# Patient Record
Sex: MALE | Race: WHITE | NOT HISPANIC OR LATINO | Employment: FULL TIME | URBAN - METROPOLITAN AREA
[De-identification: names, ages, dates, MRNs, and addresses within clinical notes are randomized per-mention and may not be internally consistent; named-entity substitution may affect disease eponyms.]

---

## 2018-03-09 ENCOUNTER — OFFICE VISIT (OUTPATIENT)
Dept: FAMILY MEDICINE | Facility: CLINIC | Age: 43
End: 2018-03-09
Payer: COMMERCIAL

## 2018-03-09 VITALS
BODY MASS INDEX: 29.86 KG/M2 | HEIGHT: 68 IN | TEMPERATURE: 96.7 F | DIASTOLIC BLOOD PRESSURE: 70 MMHG | WEIGHT: 197 LBS | HEART RATE: 72 BPM | SYSTOLIC BLOOD PRESSURE: 94 MMHG

## 2018-03-09 DIAGNOSIS — Z00.00 ANNUAL PHYSICAL EXAM: ICD-10-CM

## 2018-03-09 DIAGNOSIS — Z13.6 CARDIOVASCULAR SCREENING; LDL GOAL LESS THAN 160: Primary | ICD-10-CM

## 2018-03-09 DIAGNOSIS — Z71.6 ENCOUNTER FOR SMOKING CESSATION COUNSELING: ICD-10-CM

## 2018-03-09 PROCEDURE — 99396 PREV VISIT EST AGE 40-64: CPT | Performed by: FAMILY MEDICINE

## 2018-03-09 NOTE — NURSING NOTE
"Chief Complaint   Patient presents with     Physical       Initial BP 94/70  Pulse 72  Temp 96.7  F (35.9  C) (Tympanic)  Ht 5' 7.72\" (1.72 m)  Wt 197 lb (89.4 kg)  BMI 30.2 kg/m2 Estimated body mass index is 30.2 kg/(m^2) as calculated from the following:    Height as of this encounter: 5' 7.72\" (1.72 m).    Weight as of this encounter: 197 lb (89.4 kg).  Medication Reconciliation: complete    Current Outpatient Prescriptions   Medication Sig Dispense Refill     NO ACTIVE MEDICATIONS        HYDROcodone-acetaminophen 5-325 MG per tablet Take 1-2 tablets by mouth every 4 hours as needed for pain. (Patient not taking: Reported on 3/9/2018) 30 tablet 0     senna-docusate (SENOKOT-S;PERICOLACE) 8.6-50 MG per tablet Take 1 tablet by mouth 2 times daily. (Patient not taking: Reported on 3/9/2018) 60 tablet 1       Jay MARCUS CMA  "

## 2018-03-09 NOTE — PROGRESS NOTES
SUBJECTIVE:   CC: Andre Rose is an 42 year old male who presents for preventative health visit.     Physical   Annual:     Getting at least 3 servings of Calcium per day::  Yes    Bi-annual eye exam::  Yes    Dental care twice a year::  Yes    Sleep apnea or symptoms of sleep apnea::  None    Diet::  Regular (no restrictions)    Frequency of exercise::  2-3 days/week    Duration of exercise::  45-60 minutes    Taking medications regularly::  Yes    Medication side effects::  Not applicable    Additional concerns today::  No            Patient has history of smoking.  Patient currently smoking half to 1 pack a day.  He is motivated to quit.  He is wondering if he can start Chantix.  The past medical history significant for anal fistulas and internal hemorrhoids.  He denies any symptoms at this time.        Today's PHQ-2 Score:   PHQ-2 ( 1999 Pfizer) 3/7/2018   Q1: Little interest or pleasure in doing things 0   Q2: Feeling down, depressed or hopeless 0   PHQ-2 Score 0   Q1: Little interest or pleasure in doing things Not at all   Q2: Feeling down, depressed or hopeless Not at all   PHQ-2 Score 0       Abuse: Current or Past(Physical, Sexual or Emotional)- No  Do you feel safe in your environment - Yes    Social History   Substance Use Topics     Smoking status: Current Every Day Smoker     Packs/day: 1.00     Years: 20.00     Types: Cigarettes     Smokeless tobacco: Never Used     Alcohol use Yes      Comment: 5 drinks 1-2 times per month     No flowsheet data found.    Last PSA: No results found for: PSA    Reviewed orders with patient. Reviewed health maintenance and updated orders accordingly - Yes      Reviewed and updated as needed this visit by clinical staff  Tobacco  Allergies  Meds  Fam Hx  Soc Hx        Reviewed and updated as needed this visit by Provider            Review of Systems  C: NEGATIVE for fever, chills, change in weight  I: NEGATIVE for worrisome rashes, moles or lesions  E: NEGATIVE  "for vision changes or irritation  ENT: NEGATIVE for ear, mouth and throat problems  R: NEGATIVE for significant cough or SOB  CV: NEGATIVE for chest pain, palpitations or peripheral edema  GI: NEGATIVE for nausea, abdominal pain, heartburn, or change in bowel habits   male: negative for dysuria, hematuria, decreased urinary stream, erectile dysfunction, urethral discharge  M: NEGATIVE for significant arthralgias or myalgia  N: NEGATIVE for weakness, dizziness or paresthesias  P: NEGATIVE for changes in mood or affect    OBJECTIVE:   BP 94/70  Pulse 72  Temp 96.7  F (35.9  C) (Tympanic)  Ht 5' 7.72\" (1.72 m)  Wt 197 lb (89.4 kg)  BMI 30.2 kg/m2    Physical Exam  GENERAL: healthy, alert and no distress  EYES: Eyes grossly normal to inspection, PERRL and conjunctivae and sclerae normal  HENT: ear canals and TM's normal, nose and mouth without ulcers or lesions  NECK: no adenopathy, no asymmetry, masses, or scars and thyroid normal to palpation  RESP: lungs clear to auscultation - no rales, rhonchi or wheezes  CV: regular rate and rhythm, normal S1 S2, no S3 or S4, no murmur, click or rub, no peripheral edema and peripheral pulses strong  ABDOMEN: soft, nontender, no hepatosplenomegaly, no masses and bowel sounds normal  MS: no gross musculoskeletal defects noted, no edema  SKIN: no suspicious lesions or rashes  NEURO: Normal strength and tone, mentation intact and speech normal  PSYCH: mentation appears normal, affect normal/bright    ASSESSMENT/PLAN:   1. Annual physical exam  Labs ordered will follow up on that.  - Lipid Profile (Chol, Trig, HDL, LDL calc); Future  - Comprehensive metabolic panel; Future  - PSA, screen; Future    2. CARDIOVASCULAR SCREENING; LDL GOAL LESS THAN 160    - Lipid Profile (Chol, Trig, HDL, LDL calc); Future  - Comprehensive metabolic panel; Future    3. Encounter for smoking cessation counseling    - varenicline (CHANTIX STARTING MONTH PAK) 0.5 MG X 11 & 1 MG X 42 tablet; Take 0.5 " "mg tab daily for 3 days, then 0.5 mg tab twice daily for 4 days, then 1 mg twice daily.  Dispense: 53 tablet; Refill: 0    COUNSELING:   Reviewed preventive health counseling, as reflected in patient instructions       Regular exercise       Healthy diet/nutrition         reports that he has been smoking Cigarettes.  He has a 20.00 pack-year smoking history. He has never used smokeless tobacco.  Tobacco Cessation Action Plan: Information offered: Patient not interested at this time  Estimated body mass index is 30.2 kg/(m^2) as calculated from the following:    Height as of this encounter: 5' 7.72\" (1.72 m).    Weight as of this encounter: 197 lb (89.4 kg).       Counseling Resources:  ATP IV Guidelines  Pooled Cohorts Equation Calculator  FRAX Risk Assessment  ICSI Preventive Guidelines  Dietary Guidelines for Americans, 2010  USDA's MyPlate  ASA Prophylaxis  Lung CA Screening    Conor Corea MD  [facility] [place]  "

## 2018-03-09 NOTE — MR AVS SNAPSHOT
After Visit Summary   3/9/2018    Andre Rose    MRN: [id number]           Patient Information     Date Of Birth          1975        Visit Information        Provider Department      3/9/2018 2:00 PM [place]or Corea [place] [facility] [place]        Today's Diagnoses     CARDIOVASCULAR SCREENING; LDL GOAL LESS THAN 160    -  1    Annual physical exam        Encounter for smoking cessation counseling           Follow-ups after your visit        Follow-up notes from your care team     Return in about 3 days (around 3/12/2018) for lab only appointment fasting.      Your next 10 appointments already scheduled     Mar 16, 2018  8:45 AM CDT   [facility]   [facility]    [address] [id number] [phone]           OUTSIDE LABS: Please include name of facility and Physician that is requesting outside labs be drawn.  Please indicate if labs are fasting or non-fasting on appt notes.  Be as specific as you can on which labs are being drawn.              Future tests that were ordered for you today     Open Future Orders        Priority Expected Expires Ordered    Lipid Profile (Chol, Trig, HDL, LDL calc) Routine  3/9/2019 3/9/2018    Comprehensive metabolic panel Routine  3/9/2019 3/9/2018    PSA, screen Routine  3/9/2019 3/9/2018            Who to contact     If you have questions or need follow up information about today's clinic visit or your schedule please contact [facility] directly at [phone].  Normal or non-critical lab and imaging results will be communicated to you by MyChart, letter or phone within 4 business days after the clinic has received the results. If you do not hear from us within 7 days, please contact the clinic through MyChart or phone. If you have a critical or abnormal lab result, we will notify you by phone as soon as possible.  Submit refill requests through  "MyChart or call your pharmacy and they will forward the refill request to us. Please allow 3 business days for your refill to be completed.          Additional Information About Your Visit        MyChart Information     [employer] gives you secure access to your electronic health record. If you see a primary care provider, you can also send messages to your care team and make appointments. If you have questions, please call your primary care clinic.  If you do not have a primary care provider, please call [phone] and they will assist you.        Care EveryWhere ID     This is your Care EveryWhere ID. This could be used by other organizations to access your [facility] medical records  [id number]        Your Vitals Were     Pulse Temperature Height BMI (Body Mass Index)          72 96.7  F (35.9  C) (Tympanic) 5' 7.72\" (1.72 m) 30.2 kg/m2         Blood Pressure from Last 3 Encounters:   03/09/18 94/70   02/09/13 112/69   02/05/13 120/80    Weight from Last 3 Encounters:   03/09/18 197 lb (89.4 kg)   02/09/13 177 lb 14.4 oz (80.7 kg)   02/05/13 184 lb (83.5 kg)                 Today's Medication Changes          These changes are accurate as of 3/9/18  2:18 PM.  If you have any questions, ask your nurse or doctor.               Start taking these medicines.        Dose/Directions    varenicline 0.5 MG X 11 & 1 MG X 42 tablet   Commonly known as:  CHANTIX STARTING MONTH SHAUNNA   Used for:  Encounter for smoking cessation counseling   Started by:  Conor Corea MD        Take 0.5 mg tab daily for 3 days, then 0.5 mg tab twice daily for 4 days, then 1 mg twice daily.   Quantity:  53 tablet   Refills:  0            Where to get your medicines      These medications were sent to [id number] - [place] - [address] [facility] [address]  [address]  [address] [facility] [address] [place] [address]     Phone:  [phone]     varenicline 0.5 MG X 11 & 1 MG X 42 tablet                Primary " Care Provider Office Phone # Fax #    Ricardo Richardson [phone]       [place] [address]  [facility] [address]        Equal Access to Services     LINDA RAPHAEL : Hadkati good ku alano Soomaali, waaxda luqadaha, qaybta kaalmada adeegyada, niko yeboahaziza freeman. So [facility] [phone].    ATENCIÓN: Si habla español, tiene a rainey disposición servicios gratuitos de asistencia lingüística. [facility] [phone].    We comply with applicable federal civil rights laws and [place] laws. We do not discriminate on the basis of race, color, national origin, age, disability, sex, sexual orientation, or gender identity.            Thank you!     Thank you for choosing [facility]  for your care. Our goal is always to provide you with excellent care. Hearing back from our patients is one way we can continue to improve our services. Please take a few minutes to complete the written survey that you may receive in the mail after your visit with us. Thank you!             Your Updated Medication List - Protect others around you: Learn how to safely use, store and throw away your medicines at www.disposemymeds.org.          This list is accurate as of 3/9/18  2:18 PM.  Always use your most recent med list.                   Brand Name Dispense Instructions for use Diagnosis    HYDROcodone-acetaminophen 5-325 MG per tablet    NORCO    30 tablet    Take 1-2 tablets by mouth every 4 hours as needed for pain.    Rectal fistula       NO ACTIVE MEDICATIONS           senna-docusate 8.6-50 MG per tablet    SENOKOT-S;PERICOLACE    60 tablet    Take 1 tablet by mouth 2 times daily.    Rectal fistula       varenicline 0.5 MG X 11 & 1 MG X 42 tablet    CHANTIX STARTING MONTH SHAUNNA    53 tablet    Take 0.5 mg tab daily for 3 days, then 0.5 mg tab twice daily for 4 days, then 1 mg twice daily.    Encounter for smoking cessation counseling

## 2018-03-16 DIAGNOSIS — Z00.00 ANNUAL PHYSICAL EXAM: ICD-10-CM

## 2018-03-16 DIAGNOSIS — Z13.6 CARDIOVASCULAR SCREENING; LDL GOAL LESS THAN 160: ICD-10-CM

## 2018-03-16 LAB
ALBUMIN SERPL-MCNC: 4.1 G/DL (ref 3.4–5)
ALP SERPL-CCNC: 63 U/L (ref 40–150)
ALT SERPL W P-5'-P-CCNC: 28 U/L (ref 0–70)
ANION GAP SERPL CALCULATED.3IONS-SCNC: 8 MMOL/L (ref 3–14)
AST SERPL W P-5'-P-CCNC: 19 U/L (ref 0–45)
BILIRUB SERPL-MCNC: 0.4 MG/DL (ref 0.2–1.3)
BUN SERPL-MCNC: 17 MG/DL (ref 7–30)
CALCIUM SERPL-MCNC: 9.3 MG/DL (ref 8.5–10.1)
CHLORIDE SERPL-SCNC: 108 MMOL/L (ref 94–109)
CHOLEST SERPL-MCNC: 231 MG/DL
CO2 SERPL-SCNC: 27 MMOL/L (ref 20–32)
CREAT SERPL-MCNC: 1.12 MG/DL (ref 0.66–1.25)
GFR SERPL CREATININE-BSD FRML MDRD: 72 ML/MIN/1.7M2
GLUCOSE SERPL-MCNC: 97 MG/DL (ref 70–99)
HDLC SERPL-MCNC: 59 MG/DL
LDLC SERPL CALC-MCNC: 153 MG/DL
NONHDLC SERPL-MCNC: 172 MG/DL
POTASSIUM SERPL-SCNC: 4.4 MMOL/L (ref 3.4–5.3)
PROT SERPL-MCNC: 7 G/DL (ref 6.8–8.8)
PSA SERPL-ACNC: 3.97 UG/L (ref 0–4)
SODIUM SERPL-SCNC: 143 MMOL/L (ref 133–144)
TRIGL SERPL-MCNC: 95 MG/DL

## 2018-03-16 PROCEDURE — 36415 COLL VENOUS BLD VENIPUNCTURE: CPT | Performed by: FAMILY MEDICINE

## 2018-03-16 PROCEDURE — 80053 COMPREHEN METABOLIC PANEL: CPT | Performed by: FAMILY MEDICINE

## 2018-03-16 PROCEDURE — 80061 LIPID PANEL: CPT | Performed by: FAMILY MEDICINE

## 2018-03-16 PROCEDURE — G0103 PSA SCREENING: HCPCS | Performed by: FAMILY MEDICINE

## 2018-07-09 DIAGNOSIS — Z71.6 ENCOUNTER FOR SMOKING CESSATION COUNSELING: ICD-10-CM

## 2018-07-09 NOTE — TELEPHONE ENCOUNTER
"Requested Prescriptions   Pending Prescriptions Disp Refills     varenicline (CHANTIX STARTING MONTH SHAUNNA) 0.5 MG X 11 & 1 MG X 42 tablet  Last Written Prescription Date:  3/9/18  Last Fill Quantity: 53,  # refills: 0   Last office visit: 3/9/2018 with prescribing provider:  Anmol   Future Office Visit:     53 tablet 0     Sig: Take 0.5 mg tab daily for 3 days, then 0.5 mg tab twice daily for 4 days, then 1 mg twice daily.    Partial Cholinergic Nicotinic Agonist Agents Passed    7/9/2018  8:27 AM       Passed - Blood pressure under 140/90 in past 12 months    BP Readings from Last 3 Encounters:   03/09/18 94/70   02/09/13 112/69   02/05/13 120/80                Passed - Recent (12 mo) or future (30 days) visit within the authorizing provider's specialty    Patient had office visit in the last 12 months or has a visit in the next 30 days with authorizing provider or within the authorizing provider's specialty.  See \"Patient Info\" tab in inbasket, or \"Choose Columns\" in Meds & Orders section of the refill encounter.           Passed - Patient is 18 years of age or older          "

## 2018-07-09 NOTE — TELEPHONE ENCOUNTER
Name of caller: Andre  Relationship of Patient: Self    Reason for Call: PT called to request a refill of his varenicline (CHANTIX STARTING MONTH PAK) 0.5 MG X 11 & 1 MG X 42 tablet.  varenicline (CHANTIX STARTING MONTH PAK) 0.5 MG X 11 & 1 MG X 42 tablet 53 tablet 0 3/9/2018  --   Sig: Take 0.5 mg tab daily for 3 days, then 0.5 mg tab twice daily for 4 days, then 1 mg twice daily.   Class: E-Prescribe   Order: [id number]   E-Prescribing Status: Receipt confirmed by pharmacy (3/9/2018  2:10 PM CST)       Best phone number to reach pt at is: [phone]  Ok to leave a message with medical info? Yes    Pharmacy preferred (if calling for a refill): Lonnie Patel  Patient Representative - [facility]

## 2018-07-13 RX ORDER — VARENICLINE TARTRATE 1 MG/1
1 TABLET, FILM COATED ORAL 2 TIMES DAILY
Qty: 56 TABLET | Refills: 2 | Status: SHIPPED | OUTPATIENT
Start: 2018-07-13 | End: 2019-10-25

## 2018-07-13 NOTE — TELEPHONE ENCOUNTER
Patient states that he started the Starter pack late and is looking to have the continued month supply for chantix.  He has been using continually and today is his last.     Medication and pharmacy pended  Yue Singh RN - Triage  [facility]

## 2018-07-13 NOTE — TELEPHONE ENCOUNTER
Pt calling to check status of refill.  Encounter is closed but there is no documentation stating why the medication was not refilled.  Pt is going out of town tomorrow and today is the last day of the pill he has.  Can we refill or please note why this cannot be refilled and route appropriately.  Alejandra Marcelino

## 2019-10-25 ENCOUNTER — OFFICE VISIT (OUTPATIENT)
Dept: FAMILY MEDICINE | Facility: CLINIC | Age: 44
End: 2019-10-25
Payer: COMMERCIAL

## 2019-10-25 VITALS
DIASTOLIC BLOOD PRESSURE: 84 MMHG | HEIGHT: 69 IN | HEART RATE: 78 BPM | OXYGEN SATURATION: 99 % | TEMPERATURE: 96.6 F | WEIGHT: 201 LBS | BODY MASS INDEX: 29.77 KG/M2 | SYSTOLIC BLOOD PRESSURE: 118 MMHG

## 2019-10-25 DIAGNOSIS — Z12.5 SCREENING FOR PROSTATE CANCER: ICD-10-CM

## 2019-10-25 DIAGNOSIS — Z13.6 CARDIOVASCULAR SCREENING; LDL GOAL LESS THAN 160: ICD-10-CM

## 2019-10-25 DIAGNOSIS — R97.20 ELEVATED PROSTATE SPECIFIC ANTIGEN (PSA): ICD-10-CM

## 2019-10-25 DIAGNOSIS — Z00.00 ROUTINE GENERAL MEDICAL EXAMINATION AT A HEALTH CARE FACILITY: Primary | ICD-10-CM

## 2019-10-25 DIAGNOSIS — R30.9 PAIN WITH URINATION: ICD-10-CM

## 2019-10-25 DIAGNOSIS — Z71.6 ENCOUNTER FOR SMOKING CESSATION COUNSELING: ICD-10-CM

## 2019-10-25 DIAGNOSIS — R20.0 HAND NUMBNESS: ICD-10-CM

## 2019-10-25 DIAGNOSIS — K60.40 RECTAL FISTULA: ICD-10-CM

## 2019-10-25 LAB
ALBUMIN SERPL-MCNC: 4.5 G/DL (ref 3.4–5)
ALBUMIN UR-MCNC: NEGATIVE MG/DL
ALP SERPL-CCNC: 70 U/L (ref 40–150)
ALT SERPL W P-5'-P-CCNC: 32 U/L (ref 0–70)
ANION GAP SERPL CALCULATED.3IONS-SCNC: 6 MMOL/L (ref 3–14)
APPEARANCE UR: CLEAR
AST SERPL W P-5'-P-CCNC: 12 U/L (ref 0–45)
BILIRUB SERPL-MCNC: 0.6 MG/DL (ref 0.2–1.3)
BILIRUB UR QL STRIP: NEGATIVE
BUN SERPL-MCNC: 16 MG/DL (ref 7–30)
CALCIUM SERPL-MCNC: 9.5 MG/DL (ref 8.5–10.1)
CHLORIDE SERPL-SCNC: 104 MMOL/L (ref 94–109)
CHOLEST SERPL-MCNC: 237 MG/DL
CO2 SERPL-SCNC: 26 MMOL/L (ref 20–32)
COLOR UR AUTO: YELLOW
CREAT SERPL-MCNC: 1.06 MG/DL (ref 0.66–1.25)
GFR SERPL CREATININE-BSD FRML MDRD: 85 ML/MIN/{1.73_M2}
GLUCOSE SERPL-MCNC: 95 MG/DL (ref 70–99)
GLUCOSE UR STRIP-MCNC: NEGATIVE MG/DL
HDLC SERPL-MCNC: 62 MG/DL
HGB UR QL STRIP: NEGATIVE
KETONES UR STRIP-MCNC: NEGATIVE MG/DL
LDLC SERPL CALC-MCNC: 156 MG/DL
LEUKOCYTE ESTERASE UR QL STRIP: NEGATIVE
NITRATE UR QL: NEGATIVE
NONHDLC SERPL-MCNC: 175 MG/DL
PH UR STRIP: 7 PH (ref 5–7)
POTASSIUM SERPL-SCNC: 4.2 MMOL/L (ref 3.4–5.3)
PROT SERPL-MCNC: 7.5 G/DL (ref 6.8–8.8)
PSA SERPL-MCNC: 4.43 UG/L (ref 0–4)
SODIUM SERPL-SCNC: 136 MMOL/L (ref 133–144)
SOURCE: NORMAL
SP GR UR STRIP: 1.01 (ref 1–1.03)
TRIGL SERPL-MCNC: 96 MG/DL
UROBILINOGEN UR STRIP-ACNC: 0.2 EU/DL (ref 0.2–1)

## 2019-10-25 PROCEDURE — 80061 LIPID PANEL: CPT | Performed by: FAMILY MEDICINE

## 2019-10-25 PROCEDURE — 81003 URINALYSIS AUTO W/O SCOPE: CPT | Performed by: FAMILY MEDICINE

## 2019-10-25 PROCEDURE — 99213 OFFICE O/P EST LOW 20 MIN: CPT | Mod: 25 | Performed by: FAMILY MEDICINE

## 2019-10-25 PROCEDURE — 84153 ASSAY OF PSA TOTAL: CPT | Performed by: FAMILY MEDICINE

## 2019-10-25 PROCEDURE — 36415 COLL VENOUS BLD VENIPUNCTURE: CPT | Performed by: FAMILY MEDICINE

## 2019-10-25 PROCEDURE — 99396 PREV VISIT EST AGE 40-64: CPT | Performed by: FAMILY MEDICINE

## 2019-10-25 PROCEDURE — 87389 HIV-1 AG W/HIV-1&-2 AB AG IA: CPT | Performed by: FAMILY MEDICINE

## 2019-10-25 PROCEDURE — 80053 COMPREHEN METABOLIC PANEL: CPT | Performed by: FAMILY MEDICINE

## 2019-10-25 SDOH — HEALTH STABILITY: MENTAL HEALTH: HOW OFTEN DO YOU HAVE A DRINK CONTAINING ALCOHOL?: 2-4 TIMES A MONTH

## 2019-10-25 ASSESSMENT — MIFFLIN-ST. JEOR: SCORE: 1784.23

## 2019-10-25 NOTE — PROGRESS NOTES
SUBJECTIVE:   CC: Andre Rose is an 44 year old male who presents for preventive health visit.     Healthy Habits:    Do you get at least three servings of calcium containing foods daily (dairy, green leafy vegetables, etc.)? No    Amount of exercise or daily activities, outside of work: 0-1 day(s) per week    Problems taking medications regularly not applicable    Medication side effects: No    Have you had an eye exam in the past two years? yes    Do you see a dentist twice per year? yes    Do you have sleep apnea, excessive snoring or daytime drowsiness?no    Recently started smoking again in June and would like to restart Chantix  Left sided forearm and ahnd numbness, on and off, x couple months, no chest pain  Pain with urination, worse after alcohol, playing hockey and ejaculation     Today's PHQ-2 Score:   PHQ-2 ( 1999 Pfizer) 10/25/2019 3/7/2018   Q1: Little interest or pleasure in doing things 0 0   Q2: Feeling down, depressed or hopeless 0 0   PHQ-2 Score 0 0   Q1: Little interest or pleasure in doing things - Not at all   Q2: Feeling down, depressed or hopeless - Not at all   PHQ-2 Score - 0       Abuse: Current or Past(Physical, Sexual or Emotional)- No  Do you feel safe in your environment? Yes    Social History     Tobacco Use     Smoking status: Current Every Day Smoker     Packs/day: 0.50     Years: 20.00     Pack years: 10.00     Types: Cigarettes     Smokeless tobacco: Never Used   Substance Use Topics     Alcohol use: Yes     Frequency: 2-4 times a month     If you drink alcohol do you typically have >3 drinks per day or >7 drinks per week? No                      Last PSA:   PSA   Date Value Ref Range Status   03/16/2018 3.97 0 - 4 ug/L Final     Comment:     Assay Method:  Chemiluminescence using Siemens Vista analyzer       Reviewed orders with patient. Reviewed health maintenance and updated orders accordingly - Yes  Lab work is in process  Labs reviewed in EPIC    Reviewed and updated  "as needed this visit by clinical staff  Allergies  Meds  Fam Hx         Reviewed and updated as needed this visit by Provider            ROS:  CONSTITUTIONAL: NEGATIVE for fever, chills, change in weight  INTEGUMENTARY/SKIN: NEGATIVE for worrisome rashes, moles or lesions  EYES: NEGATIVE for vision changes or irritation  ENT: NEGATIVE for ear, mouth and throat problems  RESP: NEGATIVE for significant cough or SOB  CV: NEGATIVE for chest pain, palpitations or peripheral edema  GI: NEGATIVE for nausea, abdominal pain, heartburn, or change in bowel habits   male: negative for dysuria, hematuria, decreased urinary stream, erectile dysfunction, urethral discharge  MUSCULOSKELETAL: NEGATIVE for significant arthralgias or myalgia  NEURO: NEGATIVE for weakness, dizziness or paresthesias  PSYCHIATRIC: NEGATIVE for changes in mood or affect    OBJECTIVE:   /84   Pulse 78   Temp 96.6  F (35.9  C) (Tympanic)   Ht 1.74 m (5' 8.5\")   Wt 91.2 kg (201 lb)   SpO2 99%   BMI 30.11 kg/m    EXAM:  GENERAL: healthy, alert and no distress  EYES: Eyes grossly normal to inspection, PERRL and conjunctivae and sclerae normal  HENT: ear canals and TM's normal, nose and mouth without ulcers or lesions  NECK: no adenopathy, no asymmetry, masses, or scars and thyroid normal to palpation  RESP: lungs clear to auscultation - no rales, rhonchi or wheezes  CV: regular rate and rhythm, normal S1 S2, no S3 or S4, no murmur, click or rub, no peripheral edema and peripheral pulses strong  ABDOMEN: soft, nontender, no hepatosplenomegaly, no masses and bowel sounds normal  MS: no gross musculoskeletal defects noted, no edema  SKIN: no suspicious lesions or rashes  NEURO: Normal strength and tone, mentation intact and speech normal  PSYCH: mentation appears normal, affect normal/bright  No neck discomfort.  Range of motion in the arm is normal.  Strength is normal.      ASSESSMENT/PLAN:   1. Routine general medical examination at [facility]" "care facility    - HIV Screening  - varenicline (CHANTIX STARTING MONTH PAK) 0.5 MG X 11 & 1 MG X 42 tablet; Take 0.5 mg tab daily for 3 days, then 0.5 mg tab twice daily for 4 days, then 1 mg twice daily.  Dispense: 53 tablet; Refill: 0  - Lipid panel reflex to direct LDL Fasting  - Comprehensive metabolic panel    2. CARDIOVASCULAR SCREENING; LDL GOAL LESS THAN 160    - Lipid panel reflex to direct LDL Fasting  - Comprehensive metabolic panel    3. Encounter for smoking cessation counseling  Patient quit smoking but he restarted he would like to retry Chantix.  Discussed with him he needs to have a proper motivation and willingness to stop smoking in that case Chantix is very helpful.  - varenicline (CHANTIX STARTING MONTH PAK) 0.5 MG X 11 & 1 MG X 42 tablet; Take 0.5 mg tab daily for 3 days, then 0.5 mg tab twice daily for 4 days, then 1 mg twice daily.  Dispense: 53 tablet; Refill: 0    4. Rectal fistula  Tissues    5. Pain with urination  Planes of pain with urination after playing or drinking alcohol.  He is advised to keep himself hydrated.  Will check urine to make sure everything is normal.  If this continue further evaluation from urology.  - UA reflex to Microscopic    6. Screening for prostate cancer    - PSA, tumor marker    7. Hand numbness  On and off symptoms of numbness in the left forearm area is could be mild nerve compression.  Suggested physical therapy if symptoms persist we may need to follow further evaluation possible MRI of the neck.  Warning signs were discussed he will let us know if his symptoms does not improve with home therapy and exercises.      COUNSELING:  Reviewed preventive health counseling, as reflected in patient instructions       Regular exercise       Healthy diet/nutrition    Estimated body mass index is 30.11 kg/m  as calculated from the following:    Height as of this encounter: 1.74 m (5' 8.5\").    Weight as of this encounter: 91.2 kg (201 lb).         reports that he " has been smoking cigarettes. He has a 10.00 pack-year smoking history. He has never used smokeless tobacco.      Counseling Resources:  ATP IV Guidelines  Pooled Cohorts Equation Calculator  FRAX Risk Assessment  ICSI Preventive Guidelines  Dietary Guidelines for Americans, 2010  USDA's MyPlate  ASA Prophylaxis  Lung CA Screening    Conor Corea MD  [facility] [place]

## 2019-10-26 LAB — HIV 1+2 AB+HIV1 P24 AG SERPL QL IA: NONREACTIVE

## 2019-11-02 ENCOUNTER — HEALTH MAINTENANCE LETTER (OUTPATIENT)
Age: 44
End: 2019-11-02

## 2019-11-16 ASSESSMENT — ENCOUNTER SYMPTOMS
ABDOMINAL PAIN: 0
BLOATING: 0
DIARRHEA: 1
RECTAL PAIN: 1
VOMITING: 0
FLANK PAIN: 0
BOWEL INCONTINENCE: 0
HEMATURIA: 0
NAUSEA: 0
DIFFICULTY URINATING: 1
BLOOD IN STOOL: 1
DYSURIA: 1
JAUNDICE: 0
CONSTIPATION: 0
HEARTBURN: 1

## 2019-11-18 ENCOUNTER — OFFICE VISIT (OUTPATIENT)
Dept: UROLOGY | Facility: CLINIC | Age: 44
End: 2019-11-18
Payer: COMMERCIAL

## 2019-11-18 VITALS
BODY MASS INDEX: 29.77 KG/M2 | DIASTOLIC BLOOD PRESSURE: 68 MMHG | WEIGHT: 201 LBS | SYSTOLIC BLOOD PRESSURE: 132 MMHG | HEIGHT: 69 IN | HEART RATE: 84 BPM

## 2019-11-18 DIAGNOSIS — R97.20 ELEVATED PROSTATE SPECIFIC ANTIGEN (PSA): Primary | ICD-10-CM

## 2019-11-18 LAB
ALBUMIN UR-MCNC: NEGATIVE MG/DL
APPEARANCE UR: CLEAR
BILIRUB UR QL STRIP: NEGATIVE
COLOR UR AUTO: YELLOW
GLUCOSE UR STRIP-MCNC: NEGATIVE MG/DL
HGB UR QL STRIP: NEGATIVE
KETONES UR STRIP-MCNC: NEGATIVE MG/DL
LEUKOCYTE ESTERASE UR QL STRIP: NEGATIVE
NITRATE UR QL: NEGATIVE
PH UR STRIP: 7 PH (ref 5–7)
PR INTERVAL - MUSE: 31
SOURCE: NORMAL
SP GR UR STRIP: 1.01 (ref 1–1.03)
UROBILINOGEN UR STRIP-ACNC: 0.2 EU/DL (ref 0.2–1)

## 2019-11-18 PROCEDURE — 81003 URINALYSIS AUTO W/O SCOPE: CPT | Mod: QW | Performed by: UROLOGY

## 2019-11-18 PROCEDURE — 99244 OFF/OP CNSLTJ NEW/EST MOD 40: CPT | Mod: 25 | Performed by: UROLOGY

## 2019-11-18 PROCEDURE — 51798 US URINE CAPACITY MEASURE: CPT | Performed by: UROLOGY

## 2019-11-18 ASSESSMENT — MIFFLIN-ST. JEOR: SCORE: 1784.17

## 2019-11-18 ASSESSMENT — PAIN SCALES - GENERAL: PAINLEVEL: NO PAIN (0)

## 2019-11-18 NOTE — LETTER
11/18/2019       RE: Andre Rose  [address] [place] [address]     Dear Colleague,    Thank you for referring your patient, Andre Rose, to the [facility] [place] at [facility]. Please see a copy of my visit note below.    [facility]  Main Office: [address]  [address]  [place] [address]       CHIEF COMPLAINT:  Elevated PSA    HISTORY:   I was asked by Dr Corea to see this 44 year old gentleman who presents with an elevated PSA.  He does have an uncle with history of prostate cancer but has no first-degree relatives with a history of prostate cancer.  He had a PSA checked at a physical exam last year and it was 3.97.  With the PSA was rechecked this year and it is 4.43.  Overall he is asymptomatic.  He says that normally he has no difficulty urinating and no urgency or frequency or nocturia.  No history of gross hematuria or infections.  He says that he sometimes does have dysuria after ejaculation or after drinking alcohol.  4.43      PAST MEDICAL HISTORY:   Past Medical History:   Diagnosis Date     Anal fistula      Blood in semen      Epididymitis, bilateral      Former smoker quit 3/10    16 pack year history     Headaches      Hematospermia      Orchitis, epididymitis, and epididymo-orchitis, with abscess      Overweight (BMI 25.0-29.9)      Penile discharge      Premature ejaculation 10/10/2007     Katy        PAST SURGICAL HISTORY:   Past Surgical History:   Procedure Laterality Date     ADENOIDECTOMY       EXAM UNDER ANESTHESIA RECTUM  8/25/2012    Procedure: EXAM UNDER ANESTHESIA RECTUM;  EXAM UNDER ANESTHESIA, NO EVIDENCE OF FISTULA FOUND;  Surgeon: Goldberg, Stanley Morton, MD;  Location: [facility]     FISTULOTOMY RECTUM  12/10/2011    Procedure:FISTULOTOMY RECTUM; Surgeon:GOLDBERG, STANLEY MORTON; Location:[facility]     FISTULOTOMY RECTUM  2/9/2013    Procedure: FISTULOTOMY RECTUM;  FISTULOTOMY;   "Surgeon: Goldberg, Stanley Morton, MD;  Location: [facility] COLONOSCOPY THRU STOMA, DIAGNOSTIC  2009    repeat age 50     REMV PILONIDAL LESION SIMPLE         FAMILY HISTORY:   Family History   Problem Relation Age of Onset     Hypertension Mother      Lung Cancer Mother      Prostate Cancer Maternal Uncle         ~52     Prostate Cancer Paternal Grandfather      Alzheimer Disease Maternal Grandfather      Cancer - colorectal No family hx of      Gastrointestinal Disease No family hx of      Diabetes No family hx of      C.A.D. No family hx of      Asthma No family hx of      Cerebrovascular Disease No family hx of        SOCIAL HISTORY:   Social History     Tobacco Use     Smoking status: Current Every Day Smoker     Packs/day: 0.50     Years: 20.00     Pack years: 10.00     Types: Cigarettes     Smokeless tobacco: Never Used   Substance Use Topics     Alcohol use: Yes     Frequency: 2-4 times a month          Allergies   Allergen Reactions     Penicillins Swelling         Current Outpatient Medications:      varenicline (CHANTIX STARTING MONTH SHAUNNA) 0.5 MG X 11 & 1 MG X 42 tablet, Take 0.5 mg tab daily for 3 days, then 0.5 mg tab twice daily for 4 days, then 1 mg twice daily., Disp: 53 tablet, Rfl: 0    Review Of Systems:  Skin: No rash, pruritis, or skin pigmentation  Eyes: No changes in vision  Ears/Nose/Throat: No changes in hearing, no nosebleeds  Respiratory: No shortness of breath, dyspnea on exertion, cough, or hemoptysis  Cardiovascular: No chest pain or palpitations  Gastrointestinal: No diarrhea or constipation. No abdominal pain. No hematochezia  Genitourinary: see HPI  Musculoskeletal: No pain or swelling of joints, normal range of motion  Neurologic: No weakness or tremors  Psychiatric: No recent changes in memory or mood  Hematologic/Lymphatic/Immunologic: No easy bruising or enlarged lymph nodes  Endocrine: No weight gain or loss      PHYSICAL EXAM:    /68   Pulse 84   Ht 1.74 m (5' 8.5\")  "  Wt 91.2 kg (201 lb)   BMI 30.12 kg/m     General appearance: In NAD, conversant  HEENT: Normocephalic and atraumatic, anicteric sclera  Cardiovascular: Not examined  Respiratory: normal, non-labored breathing  Gastrointestinal: negative, Abdomen soft, non-tender, and non-distended.   Musculoskeletal: Not Examined  Peripheral Vascular/extremity: No peripheral edema  Skin: Normal temperature, turgor, and texture. No rash  Psychiatric: Appropriate affect, alert and oriented to person, place, and time    Penis: Not Examined  Scrotal Skin: Not Examined   Testicles: Not Examined  Epididymis: Not Examined  Lymphatic: Not examined  Digital Rectal Exam: The prostate is not enlarged, benign and symmetric to palpation.  No evidence of prostatitis    Cystoscopy: Not done      PSA: 4.43    UA RESULTS:  Recent Labs   Lab Test 10/25/19  0938   COLOR Yellow   APPEARANCE Clear   URINEGLC Negative   URINEBILI Negative   URINEKETONE Negative   SG 1.015   UBLD Negative   URINEPH 7.0   PROTEIN Negative   UROBILINOGEN 0.2   NITRITE Negative   LEUKEST Negative       Bladder Scan:     Other Labs:      Imaging Studies: None      CLINICAL IMPRESSION:   Elevated PSA     PLAN:   He has an elevated PSA at a young age.  The PSA is also risen over the past year.  He is in very good health.  I recommended further evaluation.  We discussed his options.  I recommended that he begin evaluation with an MRI of the prostate.  We discussed this test.  If the MRI shows abnormalities he will certainly need to proceed with a prostate biopsy and we discussed prostate biopsy in detail today as well.  Even if the MRI is negative he may need to proceed with biopsy and we discussed this possibility.  MRI was ordered for him and I will contact him with those results.      Hamzah Bowen MD    Answers for HPI/ROS submitted by the patient on 11/16/2019   General Symptoms: No  Skin Symptoms: No  HENT Symptoms: No  EYE SYMPTOMS: No  HEART SYMPTOMS: No  LUNG  SYMPTOMS: No  INTESTINAL SYMPTOMS: Yes  URINARY SYMPTOMS: Yes  REPRODUCTIVE SYMPTOMS: No  SKELETAL SYMPTOMS: No  BLOOD SYMPTOMS: No  NERVOUS SYSTEM SYMPTOMS: No  MENTAL HEALTH SYMPTOMS: No  Heart burn or indigestion: Yes  Nausea: No  Vomiting: No  Abdominal pain: No  Bloating: No  Constipation: No  Diarrhea: Yes  Blood in stool: Yes  Black stools: Yes  Rectal or Anal pain: Yes  Fecal incontinence: No  Yellowing of skin or eyes: No  Vomit with blood: No  Change in stools: No  Trouble holding urine or incontinence: No  Pain or burning: Yes  Trouble starting or stopping: Yes  Increased frequency of urination: No  Blood in urine: No  Decreased frequency of urination: No  Frequent nighttime urination: No  Flank pain: No  Difficulty emptying bladder: Yes      Again, thank you for allowing me to participate in the care of your patient.      Sincerely,    Hamzah Bowen MD

## 2019-11-18 NOTE — PROGRESS NOTES
[facility]  Main Office: [address]  [address]  [place] [address]       CHIEF COMPLAINT:  Elevated PSA    HISTORY:   I was asked by Dr Corea to see this 44 year old gentleman who presents with an elevated PSA.  He does have an uncle with history of prostate cancer but has no first-degree relatives with a history of prostate cancer.  He had a PSA checked at a physical exam last year and it was 3.97.  With the PSA was rechecked this year and it is 4.43.  Overall he is asymptomatic.  He says that normally he has no difficulty urinating and no urgency or frequency or nocturia.  No history of gross hematuria or infections.  He says that he sometimes does have dysuria after ejaculation or after drinking alcohol.  4.43      PAST MEDICAL HISTORY:   Past Medical History:   Diagnosis Date     Anal fistula      Blood in semen      Epididymitis, bilateral      Former smoker quit 3/10    16 pack year history     Headaches      Hematospermia      Orchitis, epididymitis, and epididymo-orchitis, with abscess      Overweight (BMI 25.0-29.9)      Penile discharge      Premature ejaculation 10/10/2007     Snores        PAST SURGICAL HISTORY:   Past Surgical History:   Procedure Laterality Date     ADENOIDECTOMY       EXAM UNDER ANESTHESIA RECTUM  8/25/2012    Procedure: EXAM UNDER ANESTHESIA RECTUM;  EXAM UNDER ANESTHESIA, NO EVIDENCE OF FISTULA FOUND;  Surgeon: Goldberg, Stanley Morton, MD;  Location: [facility]     FISTULOTOMY RECTUM  12/10/2011    Procedure:FISTULOTOMY RECTUM; Surgeon:GOLDBERG, STANLEY MORTON; Location:[facility]     FISTULOTOMY RECTUM  2/9/2013    Procedure: FISTULOTOMY RECTUM;  FISTULOTOMY;  Surgeon: Goldberg, Stanley Morton, MD;  Location: [facility]     HC COLONOSCOPY THRU STOMA, DIAGNOSTIC  2009    repeat age 50     REMV PILONIDAL LESION SIMPLE         FAMILY HISTORY:   Family History   Problem Relation Age of Onset     Hypertension Mother      Lung Cancer Mother      Prostate Cancer Maternal Uncle          "~52     Prostate Cancer Paternal Grandfather      Alzheimer Disease Maternal Grandfather      Cancer - colorectal No family hx of      Gastrointestinal Disease No family hx of      Diabetes No family hx of      C.A.D. No family hx of      Asthma No family hx of      Cerebrovascular Disease No family hx of        SOCIAL HISTORY:   Social History     Tobacco Use     Smoking status: Current Every Day Smoker     Packs/day: 0.50     Years: 20.00     Pack years: 10.00     Types: Cigarettes     Smokeless tobacco: Never Used   Substance Use Topics     Alcohol use: Yes     Frequency: 2-4 times a month          Allergies   Allergen Reactions     Penicillins Swelling         Current Outpatient Medications:      varenicline (CHANTIX STARTING MONTH PAK) 0.5 MG X 11 & 1 MG X 42 tablet, Take 0.5 mg tab daily for 3 days, then 0.5 mg tab twice daily for 4 days, then 1 mg twice daily., Disp: 53 tablet, Rfl: 0    Review Of Systems:  Skin: No rash, pruritis, or skin pigmentation  Eyes: No changes in vision  Ears/Nose/Throat: No changes in hearing, no nosebleeds  Respiratory: No shortness of breath, dyspnea on exertion, cough, or hemoptysis  Cardiovascular: No chest pain or palpitations  Gastrointestinal: No diarrhea or constipation. No abdominal pain. No hematochezia  Genitourinary: see HPI  Musculoskeletal: No pain or swelling of joints, normal range of motion  Neurologic: No weakness or tremors  Psychiatric: No recent changes in memory or mood  Hematologic/Lymphatic/Immunologic: No easy bruising or enlarged lymph nodes  Endocrine: No weight gain or loss      PHYSICAL EXAM:    /68   Pulse 84   Ht 1.74 m (5' 8.5\")   Wt 91.2 kg (201 lb)   BMI 30.12 kg/m    General appearance: In NAD, conversant  HEENT: Normocephalic and atraumatic, anicteric sclera  Cardiovascular: Not examined  Respiratory: normal, non-labored breathing  Gastrointestinal: negative, Abdomen soft, non-tender, and non-distended.   Musculoskeletal: Not " Examined  Peripheral Vascular/extremity: No peripheral edema  Skin: Normal temperature, turgor, and texture. No rash  Psychiatric: Appropriate affect, alert and oriented to person, place, and time    Penis: Not Examined  Scrotal Skin: Not Examined   Testicles: Not Examined  Epididymis: Not Examined  Lymphatic: Not examined  Digital Rectal Exam: The prostate is not enlarged, benign and symmetric to palpation.  No evidence of prostatitis    Cystoscopy: Not done      PSA: 4.43    UA RESULTS:  Recent Labs   Lab Test 10/25/19  0938   COLOR Yellow   APPEARANCE Clear   URINEGLC Negative   URINEBILI Negative   URINEKETONE Negative   SG 1.015   UBLD Negative   URINEPH 7.0   PROTEIN Negative   UROBILINOGEN 0.2   NITRITE Negative   LEUKEST Negative       Bladder Scan:     Other Labs:      Imaging Studies: None      CLINICAL IMPRESSION:   Elevated PSA     PLAN:   He has an elevated PSA at a young age.  The PSA is also risen over the past year.  He is in very good health.  I recommended further evaluation.  We discussed his options.  I recommended that he begin evaluation with an MRI of the prostate.  We discussed this test.  If the MRI shows abnormalities he will certainly need to proceed with a prostate biopsy and we discussed prostate biopsy in detail today as well.  Even if the MRI is negative he may need to proceed with biopsy and we discussed this possibility.  MRI was ordered for him and I will contact him with those results.      Hamzah Bowen MD    Answers for HPI/ROS submitted by the patient on 11/16/2019   General Symptoms: No  Skin Symptoms: No  HENT Symptoms: No  EYE SYMPTOMS: No  HEART SYMPTOMS: No  LUNG SYMPTOMS: No  INTESTINAL SYMPTOMS: Yes  URINARY SYMPTOMS: Yes  REPRODUCTIVE SYMPTOMS: No  SKELETAL SYMPTOMS: No  BLOOD SYMPTOMS: No  NERVOUS SYSTEM SYMPTOMS: No  MENTAL HEALTH SYMPTOMS: No  Heart burn or indigestion: Yes  Nausea: No  Vomiting: No  Abdominal pain: No  Bloating: No  Constipation: No  Diarrhea:  Yes  Blood in stool: Yes  Black stools: Yes  Rectal or Anal pain: Yes  Fecal incontinence: No  Yellowing of skin or eyes: No  Vomit with blood: No  Change in stools: No  Trouble holding urine or incontinence: No  Pain or burning: Yes  Trouble starting or stopping: Yes  Increased frequency of urination: No  Blood in urine: No  Decreased frequency of urination: No  Frequent nighttime urination: No  Flank pain: No  Difficulty emptying bladder: Yes

## 2019-12-01 ENCOUNTER — ANCILLARY PROCEDURE (OUTPATIENT)
Dept: MRI IMAGING | Facility: CLINIC | Age: 44
End: 2019-12-01
Attending: UROLOGY
Payer: COMMERCIAL

## 2019-12-01 DIAGNOSIS — R97.20 ELEVATED PROSTATE SPECIFIC ANTIGEN (PSA): ICD-10-CM

## 2019-12-01 RX ORDER — GADOBUTROL 604.72 MG/ML
10 INJECTION INTRAVENOUS ONCE
Status: COMPLETED | OUTPATIENT
Start: 2019-12-01 | End: 2019-12-01

## 2019-12-01 RX ADMIN — GADOBUTROL 9 ML: 604.72 INJECTION INTRAVENOUS at 16:32

## 2019-12-02 ENCOUNTER — TELEPHONE (OUTPATIENT)
Dept: UROLOGY | Facility: CLINIC | Age: 44
End: 2019-12-02

## 2019-12-02 DIAGNOSIS — R97.20 ELEVATED PROSTATE SPECIFIC ANTIGEN (PSA): Primary | ICD-10-CM

## 2019-12-02 NOTE — TELEPHONE ENCOUNTER
Spoke on phone re:MRI results  Recommended close follow up: see me in 2 months to recheck PSA, will need to proceed with biopsy if PSA does not improve

## 2019-12-13 ENCOUNTER — TELEPHONE (OUTPATIENT)
Dept: UROLOGY | Facility: CLINIC | Age: 44
End: 2019-12-13

## 2019-12-13 NOTE — TELEPHONE ENCOUNTER
----- Message from Catia Anglin sent at 12/2/2019  3:50 PM CST -----    ----- Message -----  From: Hamzah Bowen MD  Sent: 12/2/2019   3:43 PM CST  To: [facility] - Scheduling Pool    See me in 2 months for psa check

## 2020-08-04 DIAGNOSIS — R97.20 ELEVATED PROSTATE SPECIFIC ANTIGEN (PSA): Primary | ICD-10-CM

## 2020-08-04 DIAGNOSIS — R97.20 ELEVATED PROSTATE SPECIFIC ANTIGEN (PSA): ICD-10-CM

## 2020-08-04 LAB — PSA SERPL-MCNC: 4.7 NG/ML (ref 0–4)

## 2020-08-04 PROCEDURE — 84153 ASSAY OF PSA TOTAL: CPT | Performed by: UROLOGY

## 2020-08-04 PROCEDURE — 36415 COLL VENOUS BLD VENIPUNCTURE: CPT | Performed by: UROLOGY

## 2020-08-11 ENCOUNTER — VIRTUAL VISIT (OUTPATIENT)
Dept: UROLOGY | Facility: CLINIC | Age: 45
End: 2020-08-11
Payer: COMMERCIAL

## 2020-08-11 VITALS — HEIGHT: 68 IN | BODY MASS INDEX: 30.31 KG/M2 | WEIGHT: 200 LBS

## 2020-08-11 DIAGNOSIS — R97.20 ELEVATED PROSTATE SPECIFIC ANTIGEN (PSA): Primary | ICD-10-CM

## 2020-08-11 PROCEDURE — 99213 OFFICE O/P EST LOW 20 MIN: CPT | Mod: 95 | Performed by: UROLOGY

## 2020-08-11 RX ORDER — CIPROFLOXACIN 500 MG/1
500 TABLET, FILM COATED ORAL 2 TIMES DAILY
Qty: 6 TABLET | Refills: 0 | Status: SHIPPED | OUTPATIENT
Start: 2020-08-11 | End: 2020-08-14

## 2020-08-11 ASSESSMENT — PAIN SCALES - GENERAL: PAINLEVEL: NO PAIN (0)

## 2020-08-11 ASSESSMENT — MIFFLIN-ST. JEOR: SCORE: 1766.69

## 2020-08-11 NOTE — LETTER
"8/11/2020       RE: Andre Rose  [address] [facility] [address]     Dear Colleague,    Thank you for referring your patient, Andre Rose, to the [facility] [place] at [facility]. Please see a copy of my visit note below.    Andre Rose is a 45 year old male who is being evaluated via a billable telephone visit.        PLEASE CALL PATIENT -[phone]    The patient has been notified of following:     \"This telephone visit will be conducted via a call between you and your physician/provider. We have found that certain health care needs can be provided without the need for a physical exam.  This service lets us provide the care you need with a short phone conversation.  If a prescription is necessary we can send it directly to your pharmacy.  If lab work is needed we can place an order for that and you can then stop by our lab to have the test done at a later time.    Telephone visits are billed at different rates depending on your insurance coverage. During this emergency period, for some insurers they may be billed the same as an in-person visit.  Please reach out to your insurance provider with any questions.    If during the course of the call the physician/provider feels a telephone visit is not appropriate, you will not be charged for this service.\"    Patient has given verbal consent for Telephone visit?  Yes    What phone number would you like to be contacted at? [phone]    How would you like to obtain your AVS? MyChart    Office Visit Note  [facility]  [phone]    UROLOGIC DIAGNOSES:   Enlarged prostate and elevated PSA    CURRENT INTERVENTIONS:       HISTORY:   Andre had an MRI of the prostate performed in December and it showed an enlarged prostate measuring 46 g.  There were no areas concerning for prostate cancer.  At that time we discussed potentially performing template biopsy but " decided to wait 2 months and check a PSA again before proceeding with biopsy.  That appointment was delayed due to the coronavirus pandemic.  He did have a PSA rechecked again recently and it is up further at 4.7.      PAST MEDICAL HISTORY:   Past Medical History:   Diagnosis Date     Anal fistula      Blood in semen      Epididymitis, bilateral      Former smoker quit 3/10    16 pack year history     Headaches      Hematospermia      Orchitis, epididymitis, and epididymo-orchitis, with abscess      Overweight (BMI 25.0-29.9)      Penile discharge      Premature ejaculation 10/10/2007     Snores        PAST SURGICAL HISTORY:   Past Surgical History:   Procedure Laterality Date     ADENOIDECTOMY       EXAM UNDER ANESTHESIA RECTUM  8/25/2012    Procedure: EXAM UNDER ANESTHESIA RECTUM;  EXAM UNDER ANESTHESIA, NO EVIDENCE OF FISTULA FOUND;  Surgeon: Goldberg, Stanley Morton, MD;  Location: [facility]     FISTULOTOMY RECTUM  12/10/2011    Procedure:FISTULOTOMY RECTUM; Surgeon:GOLDBERG, STANLEY MORTON; Location:[facility]     FISTULOTOMY RECTUM  2/9/2013    Procedure: FISTULOTOMY RECTUM;  FISTULOTOMY;  Surgeon: Goldberg, Stanley Morton, MD;  Location: [facility]     HC COLONOSCOPY THRU STOMA, DIAGNOSTIC  2009    repeat age 50     REMV PILONIDAL LESION SIMPLE         FAMILY HISTORY:   Family History   Problem Relation Age of Onset     Hypertension Mother      Lung Cancer Mother      Prostate Cancer Maternal Uncle         ~52     Prostate Cancer Paternal Grandfather      Alzheimer Disease Maternal Grandfather      Cancer - colorectal No family hx of      Gastrointestinal Disease No family hx of      Diabetes No family hx of      C.A.D. No family hx of      Asthma No family hx of      Cerebrovascular Disease No family hx of        SOCIAL HISTORY:   Social History     Socioeconomic History     Marital status:      Spouse name: None     Number of children: 2     Years of education: None     Highest education level: None   Occupational  History     Employer: [employer]   Social Needs     Financial resource strain: None     Food insecurity     Worry: None     Inability: None     Transportation needs     Medical: None     Non-medical: None   Tobacco Use     Smoking status: Current Every Day Smoker     Packs/day: 0.50     Years: 20.00     Pack years: 10.00     Types: Cigarettes     Smokeless tobacco: Never Used   Substance and Sexual Activity     Alcohol use: Yes     Frequency: 2-4 times a month     Drug use: No     Sexual activity: Yes     Partners: Female   Lifestyle     Physical activity     Days per week: None     Minutes per session: None     Stress: None   Relationships     Social connections     Talks on phone: None     Gets together: None     Attends [employer] service: None     Active member of club or organization: None     Attends meetings of clubs or organizations: None     Relationship status: None     Intimate partner violence     Fear of current or ex partner: None     Emotionally abused: None     Physically abused: None     Forced sexual activity: None   Other Topics Concern     Parent/sibling w/ CABG, MI or angioplasty before 65F 55M? Not Asked   Social History Narrative     None       Review Of Systems:  Skin: No rash, pruritis, or skin pigmentation  Eyes: No changes in vision  Ears/Nose/Throat: No changes in hearing, no nosebleeds  Respiratory: No shortness of breath, dyspnea on exertion, cough, or hemoptysis  Cardiovascular: No chest pain or palpitations  Gastrointestinal: No diarrhea or constipation. No abdominal pain. No hematochezia  Genitourinary: see HPI  Musculoskeletal: No pain or swelling of joints, normal range of motion  Neurologic: No weakness or tremors  Psychiatric: No recent changes in memory or mood  Hematologic/Lymphatic/Immunologic: No easy bruising or enlarged lymph nodes  Endocrine: No weight gain or loss      PHYSICAL EXAM:    General: Alert and oriented to time, place, and self. In NAD   Lungs: no respiratory distress  or labored breathing  Neuro: Alert, oriented, speech and mentation normal   Psych: affect and mood normal      Imaging: None    Urinalysis: UA RESULTS:  Recent Labs   Lab Test 11/18/19  1535   COLOR Yellow   APPEARANCE Clear   URINEGLC Negative   URINEBILI Negative   URINEKETONE Negative   SG 1.015   UBLD Negative   URINEPH 7.0   PROTEIN Negative   UROBILINOGEN 0.2   NITRITE Negative   LEUKEST Negative       PSA: 4.7    Post Void Residual:     Other labs: None today      IMPRESSION:  Elevated PSA    PLAN:  His PSA is elevated and continues to rise. He had a negative MRI prostate but despite this I recommended prostate biopsy. Discussed risk of biopsy including bleeding and infection.  All of his questions were answered.  He wishes to proceed.  We will get him scheduled for prostate biopsy in the near future.  Ciprofloxacin was prescribed to his pharmacy.      Hamzah Bowen M.D.              Phone call duration: 12 minutes    Hamzah Bowen MD

## 2020-08-11 NOTE — PROGRESS NOTES
"Andre Rose is a 45 year old male who is being evaluated via a billable telephone visit.        PLEASE CALL PATIENT -[phone]    The patient has been notified of following:     \"This telephone visit will be conducted via a call between you and your physician/provider. We have found that certain health care needs can be provided without the need for a physical exam.  This service lets us provide the care you need with a short phone conversation.  If a prescription is necessary we can send it directly to your pharmacy.  If lab work is needed we can place an order for that and you can then stop by our lab to have the test done at a later time.    Telephone visits are billed at different rates depending on your insurance coverage. During this emergency period, for some insurers they may be billed the same as an in-person visit.  Please reach out to your insurance provider with any questions.    If during the course of the call the physician/provider feels a telephone visit is not appropriate, you will not be charged for this service.\"    Patient has given verbal consent for Telephone visit?  Yes    What phone number would you like to be contacted at? [phone]    How would you like to obtain your AVS? MyChart    Office Visit Note  [facility]  [phone]    UROLOGIC DIAGNOSES:   Enlarged prostate and elevated PSA    CURRENT INTERVENTIONS:       HISTORY:   Andre had an MRI of the prostate performed in December and it showed an enlarged prostate measuring 46 g.  There were no areas concerning for prostate cancer.  At that time we discussed potentially performing template biopsy but decided to wait 2 months and check a PSA again before proceeding with biopsy.  That appointment was delayed due to the coronavirus pandemic.  He did have a PSA rechecked again recently and it is up further at 4.7.      PAST MEDICAL HISTORY:   Past Medical History:   Diagnosis Date     Anal fistula      Blood in " semen      Epididymitis, bilateral      Former smoker quit 3/10    16 pack year history     Headaches      Hematospermia      Orchitis, epididymitis, and epididymo-orchitis, with abscess      Overweight (BMI 25.0-29.9)      Penile discharge      Premature ejaculation 10/10/2007     Snores        PAST SURGICAL HISTORY:   Past Surgical History:   Procedure Laterality Date     ADENOIDECTOMY       EXAM UNDER ANESTHESIA RECTUM  8/25/2012    Procedure: EXAM UNDER ANESTHESIA RECTUM;  EXAM UNDER ANESTHESIA, NO EVIDENCE OF FISTULA FOUND;  Surgeon: Goldberg, Stanley Morton, MD;  Location: [facility]     FISTULOTOMY RECTUM  12/10/2011    Procedure:FISTULOTOMY RECTUM; Surgeon:GOLDBERG, STANLEY MORTON; Location:[facility]     FISTULOTOMY RECTUM  2/9/2013    Procedure: FISTULOTOMY RECTUM;  FISTULOTOMY;  Surgeon: Goldberg, Stanley Morton, MD;  Location: [facility]     HC COLONOSCOPY THRU STOMA, DIAGNOSTIC  2009    repeat age 50     REMV PILONIDAL LESION SIMPLE         FAMILY HISTORY:   Family History   Problem Relation Age of Onset     Hypertension Mother      Lung Cancer Mother      Prostate Cancer Maternal Uncle         ~52     Prostate Cancer Paternal Grandfather      Alzheimer Disease Maternal Grandfather      Cancer - colorectal No family hx of      Gastrointestinal Disease No family hx of      Diabetes No family hx of      C.A.D. No family hx of      Asthma No family hx of      Cerebrovascular Disease No family hx of        SOCIAL HISTORY:   Social History     Socioeconomic History     Marital status:      Spouse name: None     Number of children: 2     Years of education: None     Highest education level: None   Occupational History     Employer: [employer]   Social Needs     Financial resource strain: None     Food insecurity     Worry: None     Inability: None     Transportation needs     Medical: None     Non-medical: None   Tobacco Use     Smoking status: Current Every Day Smoker     Packs/day: 0.50     Years: 20.00     Pack years:  10.00     Types: Cigarettes     Smokeless tobacco: Never Used   Substance and Sexual Activity     Alcohol use: Yes     Frequency: 2-4 times a month     Drug use: No     Sexual activity: Yes     Partners: Female   Lifestyle     Physical activity     Days per week: None     Minutes per session: None     Stress: None   Relationships     Social connections     Talks on phone: None     Gets together: None     Attends Druze service: None     Active member of club or organization: None     Attends meetings of clubs or organizations: None     Relationship status: None     Intimate partner violence     Fear of current or ex partner: None     Emotionally abused: None     Physically abused: None     Forced sexual activity: None   Other Topics Concern     Parent/sibling w/ CABG, MI or angioplasty before 65F 55M? Not Asked   Social History Narrative     None       Review Of Systems:  Skin: No rash, pruritis, or skin pigmentation  Eyes: No changes in vision  Ears/Nose/Throat: No changes in hearing, no nosebleeds  Respiratory: No shortness of breath, dyspnea on exertion, cough, or hemoptysis  Cardiovascular: No chest pain or palpitations  Gastrointestinal: No diarrhea or constipation. No abdominal pain. No hematochezia  Genitourinary: see HPI  Musculoskeletal: No pain or swelling of joints, normal range of motion  Neurologic: No weakness or tremors  Psychiatric: No recent changes in memory or mood  Hematologic/Lymphatic/Immunologic: No easy bruising or enlarged lymph nodes  Endocrine: No weight gain or loss      PHYSICAL EXAM:    General: Alert and oriented to time, place, and self. In NAD   Lungs: no respiratory distress or labored breathing  Neuro: Alert, oriented, speech and mentation normal   Psych: affect and mood normal      Imaging: None    Urinalysis: UA RESULTS:  Recent Labs   Lab Test 11/18/19  1535   COLOR Yellow   APPEARANCE Clear   URINEGLC Negative   URINEBILI Negative   URINEKETONE Negative   SG 1.015   UBLD  Negative   URINEPH 7.0   PROTEIN Negative   UROBILINOGEN 0.2   NITRITE Negative   LEUKEST Negative       PSA: 4.7    Post Void Residual:     Other labs: None today      IMPRESSION:  Elevated PSA    PLAN:  His PSA is elevated and continues to rise. He had a negative MRI prostate but despite this I recommended prostate biopsy. Discussed risk of biopsy including bleeding and infection.  All of his questions were answered.  He wishes to proceed.  We will get him scheduled for prostate biopsy in the near future.  Ciprofloxacin was prescribed to his pharmacy.      Hamzah Bowen M.D.              Phone call duration: 12 minutes    Hamzah Bowen MD

## 2020-09-15 ENCOUNTER — OFFICE VISIT (OUTPATIENT)
Dept: UROLOGY | Facility: CLINIC | Age: 45
End: 2020-09-15
Payer: COMMERCIAL

## 2020-09-15 VITALS
OXYGEN SATURATION: 92 % | SYSTOLIC BLOOD PRESSURE: 110 MMHG | DIASTOLIC BLOOD PRESSURE: 68 MMHG | WEIGHT: 201 LBS | HEART RATE: 92 BPM | BODY MASS INDEX: 29.77 KG/M2 | HEIGHT: 69 IN

## 2020-09-15 DIAGNOSIS — N40.0 ENLARGED PROSTATE: Primary | ICD-10-CM

## 2020-09-15 DIAGNOSIS — R97.20 ELEVATED PROSTATE SPECIFIC ANTIGEN (PSA): ICD-10-CM

## 2020-09-15 PROCEDURE — 88305 TISSUE EXAM BY PATHOLOGIST: CPT | Performed by: UROLOGY

## 2020-09-15 PROCEDURE — 76872 US TRANSRECTAL: CPT | Performed by: UROLOGY

## 2020-09-15 PROCEDURE — 55700 ZZHC BIOPSY PROSTATE NEEDLE/PUNCH: CPT | Performed by: UROLOGY

## 2020-09-15 RX ORDER — LIDOCAINE HYDROCHLORIDE 10 MG/ML
15 INJECTION, SOLUTION INFILTRATION; PERINEURAL ONCE
Status: COMPLETED | OUTPATIENT
Start: 2020-09-15 | End: 2020-09-15

## 2020-09-15 RX ADMIN — LIDOCAINE HYDROCHLORIDE 15 ML: 10 INJECTION, SOLUTION INFILTRATION; PERINEURAL at 14:55

## 2020-09-15 ASSESSMENT — PAIN SCALES - GENERAL
PAINLEVEL: NO PAIN (0)
PAINLEVEL: NO PAIN (0)

## 2020-09-15 ASSESSMENT — MIFFLIN-ST. JEOR: SCORE: 1779.17

## 2020-09-15 NOTE — PATIENT INSTRUCTIONS
.[facility]  Transrectal Ultrasound  Post Operative Information    The physician who performed your Transrectal Ultrasound is Dr. Bowen (telephone number [phone]).  Please contact this doctor if you have any problems or questions.  If unable to reach your doctor, please return to the Emergency Department.      Take one antibiotic the evening of the procedure and then as directed on your prescription.    Drink at least 6-8 glasses of fluids for the first 48 hours.    Avoid heavy lifting and strenuous activity for 48 hours.    Avoid sexual intercourse for the first 24 hours.    No aspirin or ibuprofen products (Motrin, Advil, Nuprin, ect.) for one week.  You may take acetaminophen (Tylenol) for pain.    You may notice a small amount of blood on the tissue after a bowel movement.    You may pass blood with clots in your urine following the procedure.  The amount will decrease with time but may be visible for up to two weeks.     You make have blood in your semen for 4 weeks after the procedure.    You may experience mild perineal (groin area) discomfort after the procedure.    Please call you doctor if you have any of the follow symptoms:  Fever  Increase in the amount of blood passed  Severe discomfort or pain

## 2020-09-15 NOTE — PROGRESS NOTES
Andre Rose is here for a transrectal altrasound guided needle biopsy of the prostate for a significant risk of potentially lethal prostate cancer.    The risks, benefits, of the procudure were discussed.  All questions were answered.  A written informed consent was obtained.      PSA   Date Value Ref Range Status   10/25/2019 4.43 (H) 0 - 4 ug/L Final     Comment:     Assay Method:  Chemiluminescence using Siemens Vista analyzer   03/16/2018 3.97 0 - 4 ug/L Final     Comment:     Assay Method:  Chemiluminescence using Siemens Vista analyzer       An enema was completed and 500 mg of Cipro twice daily was started prior to the biopsy.  After obtaining informed consent patient was placed in lateral decubitus position.  The ultrasound probe was placed in the rectum.  The prostate was numbed using ultrasound guidance with 1% lidocaine 5 mls along each nerve bundle.      US images were used to guide the biopsies of the prostate.  12 cores were taken with 6 on each side, 2 at the base,  2 at the midgland and  2 at the apex.  The patient tolerated the procedure well.      We will follow up with the results in 7-10 days and contact patient with these results.

## 2020-09-15 NOTE — LETTER
9/15/2020       RE: Andre Rose  [address] [place] [address]     Dear Colleague,    Thank you for referring your patient, Andre Rose, to the [facility] [place] [facility]. Please see a copy of my visit note below.    Andre Rose is here for a transrectal altrasound guided needle biopsy of the prostate for a significant risk of potentially lethal prostate cancer.    The risks, benefits, of the procudure were discussed.  All questions were answered.  A written informed consent was obtained.      PSA   Date Value Ref Range Status   10/25/2019 4.43 (H) 0 - 4 ug/L Final     Comment:     Assay Method:  Chemiluminescence using Siemens Vista analyzer   03/16/2018 3.97 0 - 4 ug/L Final     Comment:     Assay Method:  Chemiluminescence using Siemens Vista analyzer       An enema was completed and 500 mg of Cipro twice daily was started prior to the biopsy.  After obtaining informed consent patient was placed in lateral decubitus position.  The ultrasound probe was placed in the rectum.  The prostate was numbed using ultrasound guidance with 1% lidocaine 5 mls along each nerve bundle.      US images were used to guide the biopsies of the prostate.  12 cores were taken with 6 on each side, 2 at the base,  2 at the midgland and  2 at the apex.  The patient tolerated the procedure well.      We will follow up with the results in 7-10 days and contact patient with these results.        Again, thank you for allowing me to participate in the care of your patient.      Sincerely,    Hamzah Bowen MD

## 2020-09-15 NOTE — NURSING NOTE
The following medication was given:     MEDICATION:  Lidocaine 1% Soln  ROUTE: Local Infiltration   SITE: Prostate  DOSE: 150mg/15ml  LOT #: [id number]  : Hospira  EXPIRATION DATE: 08/01/2021  NDC#: 4664-5109-52  Was there drug waste? Yes  Amount of drug waste (mL): 5.  Reason for waste:  As per MD. Not Used.  Multi-dose vial: Yes    Jacquelyn Isabel LPN  September 15, 2020

## 2020-09-15 NOTE — NURSING NOTE
Chief Complaint   Patient presents with     Elevated PSA     Patient here for Transrectal Ultrasound Guided Prostate Biopsies      Prior to the start of the procedure and with procedural staff participation, I verbally confirmed the patient s identity using two indicators, relevant allergies, that the procedure was appropriate and matched the consent or emergent situation, and that the correct equipment/implants were available. Immediately prior to starting the procedure I conducted the Time Out with the procedural staff and re-confirmed the patient s name, procedure, and site/side. (The Joint Commission universal protocol was followed.)  Yes    Sedation (Moderate or Deep): None  Consent read and signed: Yes     Allergies   Allergen Reactions     Morphine Sulfate Nausea and Vomiting and Cramps     Pre-operative antibiotics taken: Yes  Aspirin or other blood thinning medications not taken in 7-10 days:  Yes  Time of Fleet's enema: 1:15pm  Jacquelyn Isabel LPN

## 2020-09-17 LAB — COPATH REPORT: NORMAL

## 2020-09-24 ENCOUNTER — VIRTUAL VISIT (OUTPATIENT)
Dept: UROLOGY | Facility: CLINIC | Age: 45
End: 2020-09-24
Payer: COMMERCIAL

## 2020-09-24 VITALS — WEIGHT: 202 LBS | HEIGHT: 68 IN | BODY MASS INDEX: 30.62 KG/M2

## 2020-09-24 DIAGNOSIS — R97.20 ELEVATED PROSTATE SPECIFIC ANTIGEN (PSA): ICD-10-CM

## 2020-09-24 DIAGNOSIS — N40.0 ENLARGED PROSTATE: Primary | ICD-10-CM

## 2020-09-24 PROCEDURE — 99213 OFFICE O/P EST LOW 20 MIN: CPT | Mod: 95 | Performed by: UROLOGY

## 2020-09-24 ASSESSMENT — MIFFLIN-ST. JEOR: SCORE: 1775.77

## 2020-09-24 ASSESSMENT — PAIN SCALES - GENERAL: PAINLEVEL: NO PAIN (0)

## 2020-09-24 NOTE — LETTER
"9/24/2020       RE: Andre Rose  [address] [facility] [address]     Dear Colleague,    Thank you for referring your patient, Andre Rose, to the [facility] [place] at [facility]. Please see a copy of my visit note below.    Andre Rose is a 45 year old male who is being evaluated via a billable video visit.      The patient has been notified of following:     \"This video visit will be conducted via a call between you and your physician/provider. We have found that certain health care needs can be provided without the need for an in-person physical exam.  This service lets us provide the care you need with a video conversation.  If a prescription is necessary we can send it directly to your pharmacy.  If lab work is needed we can place an order for that and you can then stop by our lab to have the test done at a later time.    Video visits are billed at different rates depending on your insurance coverage.  Please reach out to your insurance provider with any questions.    If during the course of the call the physician/provider feels a video visit is not appropriate, you will not be charged for this service.\"    Patient has given verbal consent for Video visit? Yes  How would you like to obtain your AVS? MyChart  If you are dropped from the video visit, the video invite should be resent to: Text to cell phone: [phone]  Will anyone else be joining your video visit? No      Office Visit Note  [facility]  [phone]    UROLOGIC DIAGNOSES:   Elevated PSA, enlarged prostate    CURRENT INTERVENTIONS:   Negative MRI and prostate biopsy    HISTORY:   Andre is set up for virtual visit today in order to go over his prostate mass results.  His MRI did not show any areas concerning for prostate cancer but we still proceeded with a normal template biopsy for the prostate.  All biopsies were negative.  No evidence of " cancer.      PAST MEDICAL HISTORY:   Past Medical History:   Diagnosis Date     Anal fistula      Blood in semen      Epididymitis, bilateral      Former smoker quit 3/10    16 pack year history     Headaches      Hematospermia      Orchitis, epididymitis, and epididymo-orchitis, with abscess      Overweight (BMI 25.0-29.9)      Penile discharge      Premature ejaculation 10/10/2007     Snores        PAST SURGICAL HISTORY:   Past Surgical History:   Procedure Laterality Date     ADENOIDECTOMY       EXAM UNDER ANESTHESIA RECTUM  8/25/2012    Procedure: EXAM UNDER ANESTHESIA RECTUM;  EXAM UNDER ANESTHESIA, NO EVIDENCE OF FISTULA FOUND;  Surgeon: Goldberg, Stanley Morton, MD;  Location: [facility]     FISTULOTOMY RECTUM  12/10/2011    Procedure:FISTULOTOMY RECTUM; Surgeon:GOLDBERG, STANLEY MORTON; Location:[facility]     FISTULOTOMY RECTUM  2/9/2013    Procedure: FISTULOTOMY RECTUM;  FISTULOTOMY;  Surgeon: Goldberg, Stanley Morton, MD;  Location: [facility]     HC COLONOSCOPY THRU STOMA, DIAGNOSTIC  2009    repeat age 50     REMV PILONIDAL LESION SIMPLE         FAMILY HISTORY:   Family History   Problem Relation Age of Onset     Hypertension Mother      Lung Cancer Mother      Prostate Cancer Maternal Uncle         ~52     Prostate Cancer Paternal Grandfather      Alzheimer Disease Maternal Grandfather      Cancer - colorectal No family hx of      Gastrointestinal Disease No family hx of      Diabetes No family hx of      C.A.D. No family hx of      Asthma No family hx of      Cerebrovascular Disease No family hx of        SOCIAL HISTORY:   Social History     Tobacco Use     Smoking status: Current Every Day Smoker     Packs/day: 0.50     Years: 20.00     Pack years: 10.00     Types: Cigarettes     Smokeless tobacco: Never Used   Substance Use Topics     Alcohol use: Yes     Frequency: 2-4 times a month       REVIEW OF SYSTEMS:  Skin: No rash, pruritis, or skin pigmentation  Eyes: No changes in vision  Ears/Nose/Throat: No changes in  hearing, no nosebleeds  Respiratory: No shortness of breath, dyspnea on exertion, cough, or hemoptysis  Cardiovascular: No chest pain or palpitations  Gastrointestinal: No diarrhea or constipation. No abdominal pain. No hematochezia  Genitourinary: see HPI  Musculoskeletal: No pain or swelling of joints, normal range of motion  Neurologic: No weakness or tremors  Psychiatric: No recent changes in memory or mood  Hematologic/Lymphatic/Immunologic: No easy bruising or enlarged lymph nodes  Endocrine: No weight gain or loss      PHYSICAL EXAM:    General: Alert and oriented to time, place, and self. In NAD   HEENT: Head AT/NC, EOMI, CN Grossly intact   Lungs: no respiratory distress, or pursed lip breathing   Heart: No obvious jugular venous distension present   Musculoskeltal: Normal movements. Normal appearing musculature  Skin: no suspicious lesions or rashes   Neuro: Alert, oriented, speech and mentation normal; moving all 4 extremities equally.   Psych: affect and mood normal    Imaging: None    Urinalysis: UA RESULTS:  Recent Labs   Lab Test 11/18/19  1535   COLOR Yellow   APPEARANCE Clear   URINEGLC Negative   URINEBILI Negative   URINEKETONE Negative   SG 1.015   UBLD Negative   URINEPH 7.0   PROTEIN Negative   UROBILINOGEN 0.2   NITRITE Negative   LEUKEST Negative       PSA: 4.7    Post Void Residual:     Other labs: None today      IMPRESSION:  Negative prostate biopsy    PLAN:  We discussed his negative prostate biopsy results.  He was provided reassurance.  The PSA is still significantly elevated for his age so I recommended that we continue to follow the PSA.  I will see him back in 1 year for another PSA and exam.      Hamzah Bowen M.D.              Video-Visit Details    Type of service:  Video Visit    Video Start Time: 10:02 AM  Video End Time: 10:09 AM    Originating Location (pt. Location): Home    Distant Location (provider location):  [facility] [place]      Platform used for Video Visit: Parveen Bowen MD

## 2020-09-24 NOTE — NURSING NOTE
Chief Complaint   Patient presents with     Clinic Care Coordination - Follow-up     biopsy results   Dahlia Ibarra LPN

## 2020-09-24 NOTE — PROGRESS NOTES
"Andre Rose is a 45 year old male who is being evaluated via a billable video visit.      The patient has been notified of following:     \"This video visit will be conducted via a call between you and your physician/provider. We have found that certain health care needs can be provided without the need for an in-person physical exam.  This service lets us provide the care you need with a video conversation.  If a prescription is necessary we can send it directly to your pharmacy.  If lab work is needed we can place an order for that and you can then stop by our lab to have the test done at a later time.    Video visits are billed at different rates depending on your insurance coverage.  Please reach out to your insurance provider with any questions.    If during the course of the call the physician/provider feels a video visit is not appropriate, you will not be charged for this service.\"    Patient has given verbal consent for Video visit? Yes  How would you like to obtain your AVS? MyChart  If you are dropped from the video visit, the video invite should be resent to: Text to cell phone: [phone]  Will anyone else be joining your video visit? No      Office Visit Note  [facility]  [phone]    UROLOGIC DIAGNOSES:   Elevated PSA, enlarged prostate    CURRENT INTERVENTIONS:   Negative MRI and prostate biopsy    HISTORY:   Andre is set up for virtual visit today in order to go over his prostate mass results.  His MRI did not show any areas concerning for prostate cancer but we still proceeded with a normal template biopsy for the prostate.  All biopsies were negative.  No evidence of cancer.      PAST MEDICAL HISTORY:   Past Medical History:   Diagnosis Date     Anal fistula      Blood in semen      Epididymitis, bilateral      Former smoker quit 3/10    16 pack year history     Headaches      Hematospermia      Orchitis, epididymitis, and epididymo-orchitis, with abscess      Overweight " (BMI 25.0-29.9)      Penile discharge      Premature ejaculation 10/10/2007     Snores        PAST SURGICAL HISTORY:   Past Surgical History:   Procedure Laterality Date     ADENOIDECTOMY       EXAM UNDER ANESTHESIA RECTUM  8/25/2012    Procedure: EXAM UNDER ANESTHESIA RECTUM;  EXAM UNDER ANESTHESIA, NO EVIDENCE OF FISTULA FOUND;  Surgeon: Goldberg, Stanley Morton, MD;  Location: [facility]     FISTULOTOMY RECTUM  12/10/2011    Procedure:FISTULOTOMY RECTUM; Surgeon:GOLDBERG, STANLEY MORTON; Location:[facility]     FISTULOTOMY RECTUM  2/9/2013    Procedure: FISTULOTOMY RECTUM;  FISTULOTOMY;  Surgeon: Goldberg, Stanley Morton, MD;  Location: [facility]     HC COLONOSCOPY THRU STOMA, DIAGNOSTIC  2009    repeat age 50     REMV PILONIDAL LESION SIMPLE         FAMILY HISTORY:   Family History   Problem Relation Age of Onset     Hypertension Mother      Lung Cancer Mother      Prostate Cancer Maternal Uncle         ~52     Prostate Cancer Paternal Grandfather      Alzheimer Disease Maternal Grandfather      Cancer - colorectal No family hx of      Gastrointestinal Disease No family hx of      Diabetes No family hx of      C.A.D. No family hx of      Asthma No family hx of      Cerebrovascular Disease No family hx of        SOCIAL HISTORY:   Social History     Tobacco Use     Smoking status: Current Every Day Smoker     Packs/day: 0.50     Years: 20.00     Pack years: 10.00     Types: Cigarettes     Smokeless tobacco: Never Used   Substance Use Topics     Alcohol use: Yes     Frequency: 2-4 times a month       REVIEW OF SYSTEMS:  Skin: No rash, pruritis, or skin pigmentation  Eyes: No changes in vision  Ears/Nose/Throat: No changes in hearing, no nosebleeds  Respiratory: No shortness of breath, dyspnea on exertion, cough, or hemoptysis  Cardiovascular: No chest pain or palpitations  Gastrointestinal: No diarrhea or constipation. No abdominal pain. No hematochezia  Genitourinary: see HPI  Musculoskeletal: No pain or swelling of joints,  normal range of motion  Neurologic: No weakness or tremors  Psychiatric: No recent changes in memory or mood  Hematologic/Lymphatic/Immunologic: No easy bruising or enlarged lymph nodes  Endocrine: No weight gain or loss      PHYSICAL EXAM:    General: Alert and oriented to time, place, and self. In NAD   HEENT: Head AT/NC, EOMI, CN Grossly intact   Lungs: no respiratory distress, or pursed lip breathing   Heart: No obvious jugular venous distension present   Musculoskeltal: Normal movements. Normal appearing musculature  Skin: no suspicious lesions or rashes   Neuro: Alert, oriented, speech and mentation normal; moving all 4 extremities equally.   Psych: affect and mood normal    Imaging: None    Urinalysis: UA RESULTS:  Recent Labs   Lab Test 11/18/19  1535   COLOR Yellow   APPEARANCE Clear   URINEGLC Negative   URINEBILI Negative   URINEKETONE Negative   SG 1.015   UBLD Negative   URINEPH 7.0   PROTEIN Negative   UROBILINOGEN 0.2   NITRITE Negative   LEUKEST Negative       PSA: 4.7    Post Void Residual:     Other labs: None today      IMPRESSION:  Negative prostate biopsy    PLAN:  We discussed his negative prostate biopsy results.  He was provided reassurance.  The PSA is still significantly elevated for his age so I recommended that we continue to follow the PSA.  I will see him back in 1 year for another PSA and exam.      Hamzah Bowen M.D.              Video-Visit Details    Type of service:  Video Visit    Video Start Time: 10:02 AM  Video End Time: 10:09 AM    Originating Location (pt. Location): Home    Distant Location (provider location):  [facility] [place]     Platform used for Video Visit: [employer]    Hamzah Bowen MD

## 2021-01-15 ENCOUNTER — HEALTH MAINTENANCE LETTER (OUTPATIENT)
Age: 46
End: 2021-01-15

## 2021-02-18 ENCOUNTER — E-VISIT (OUTPATIENT)
Dept: PEDIATRICS | Facility: CLINIC | Age: 46
End: 2021-02-18
Payer: COMMERCIAL

## 2021-02-18 DIAGNOSIS — U07.1 INFECTION DUE TO 2019 NOVEL CORONAVIRUS: Primary | ICD-10-CM

## 2021-02-18 PROCEDURE — 99422 OL DIG E/M SVC 11-20 MIN: CPT | Performed by: PREVENTIVE MEDICINE

## 2021-02-18 RX ORDER — ACETAMINOPHEN 500 MG
500 TABLET ORAL EVERY 8 HOURS PRN
Qty: 30 TABLET | Refills: 0 | Status: SHIPPED | OUTPATIENT
Start: 2021-02-18 | End: 2021-02-22

## 2021-02-18 NOTE — PATIENT INSTRUCTIONS
Take tylenol 500 mg three times per day as needed for pain, fever.  Push fluids.  Get a pulse oximeter to measure your o2 sat and ensure it stays greater than 90%.  If it does not or you get more shortness of breath, get evaluated in an urgent care or ER.  I also recommend signing up or the get well loop as described below.  You should be reassessed by your primary care provider in 2 days.           Patient Education     [employer] Video Sheets  Symptoms of COVID-19 Infection  You're not feeling well. You're worried you may be infected with the COVID-19 virus. But what are the signs? Here's what to look for.  To watch the video:  Scan the QR code  Using your mobile device, scan the following code:       OR  Go to the website:  www.[employer]  Enter the prescription code:   RQ9     2020 [employer].           Patient Education     Understanding COVID-19 (Coronavirus Disease 2019)  COVID-19 is an illness of the lungs. It causes fever, coughing and trouble breathing. The illness is caused by a new virus in the coronavirus family called SARS-CoV-2.  First seen in late 2019, this virus has spread to many cities and countries around the world. Scientists are working to understand it better.      To help prevent spreading the infection, wash your hands often, or use an alcohol-based hand .   For the latest information, visit the CDC website at www.cdc.gov/coronavirus/2019-ncov. Or call 202-HOB-QGMF (626-125-2912).   How does COVID-19 spread?  The virus seems to spread and infect people fairly easily. It may spread by:    Breathing in droplets of fluid that someone coughs or sneezes into the air.    Touching your eyes, nose or mouth after touching an infected surface. (The virus can live on handles, objects, and other surfaces.)  What are the symptoms of COVID-19?  Some people have no symptoms or only mild symptoms. Symptoms can vary from person to person. As experts learn more about COVID-19, new  symptoms are being reported.  Symptoms may appear 2 to 14 days after contact with the virus. They include:    Fever or chills    Coughing    Trouble breathing or feeling short of breath    Sore throat    Stuffy or runny nose    Headache and body aches    Fatigue (feeling very tired)    Nausea or vomiting (feeling sick to your stomach or throwing up)    Diarrhea (loose, watery poop)    Abdominal (belly) pain    Loss of smell or taste  You can check your symptoms with the [facility] s Coronavirus Self-.   What are possible problems from COVID-19?  In many cases, this virus can cause infection (pneumonia) in both lungs. This can make a person very ill, and it can even cause death.  Your chances of severe illness are higher if:    You re an older adult    You have a serious health issue, such as heart or lung disease, diabetes or kidney disease    You have a health problem (or take a medicine) that suppresses the immune system  Rarely, some children develop a severe problem called MIS-C. This is similar to [place] disease, which causes blood vessels and body organs to be inflamed. We don t yet know if MIS-C happens only in children, or if adults are also at risk. We also don t know if it's related to COVID-19--many children with MIS-C test positive for the virus, but not all.  Experts continue to study MIS-C. The CDC has asked hospitals to report any person under 21 who is ill enough to be in the hospital and has all of the following:    A fever over 100.4 F (38.0 C) for more than 24 hours and either a positive COVID-19 test or exposure to the virus in the last 4 weeks    Inflammation in at least 2 organs such as the heart, lungs or kidneys, with lab tests that show inflammation    No other diagnoses besides COVID-19 explain the child's symptoms  How is COVID-19 diagnosed?  Your care team will ask about your symptoms, recent travel and contact with sick people.  Not everyone will be tested for the virus. Tests that  check for current COVID-19 infection include:    Viral (PCR) tests. Viral tests are very accurate. Testers may use a cotton swab to take a sample of cells from your nose and throat, or they may take a sample of your saliva (spit). Some tests can be done at home, while others are done at testing sites. Depending on the test, results might come back in about 30 minutes, or they may take several days (because they must be sent to a lab). Home test kits are now available in some areas, often with prescription. If you use a home kit, follow the instructions closely.    Antigen tests. Testers may use a cotton swab to take a sample of cells from your nose and throat. Depending on the test, some results are back within an hour. This test is good at finding COVID-19, but it sometimes shows that someone has the virus when they don t (false positive), especially in places where few people have the virus. Antigen tests are more likely to miss a COVID-19 infection than a viral test. If your antigen test is negative (shows you don t have the virus), but you have symptoms of COVID-19, your care team may order a viral test.  You may have other tests as well, such as:    Antibody (blood test).  This test may show if you ve had the virus in the past--it won t tell us if you have it right now. (It takes a few weeks for the antibodies to show up in your blood). If you ve had the virus, you may have immunity (protection from the virus) in the future. We don t know yet how long you would be immune. Antibody tests aren t always accurate.    Sputum test (we may collect mucus that you have coughed up from your lungs).    Chest X-ray or CT scan.  Note about immunity  We don t yet know if people can catch COVID-19 more than once. If a person who has fully recovered is re-tested within 3 months, the test may still show low levels of the virus in their body. (In other words, the test may show that they still have COVID-19, even though they  aren t spreading it to others.)  This doesn't mean they can't catch COVID-19 again. They may be protected from the virus for a time, but we don t know how long immunity might last.  How is COVID-19 treated?  The FDA has approved a vaccine to prevent COVID-19 in people 16 years and older who are not pregnant or breastfeeding. It's not yet available to the entire public. The first people to get the vaccine are healthcare staff and those living in long-term care facilities. The vaccine is given as a shot (injection) in the arm muscle. Two doses are needed. The second dose is given several weeks after the first.  For those who have COVID-19, the most proven treatment is to support your body while it fights the virus.    Getting extra rest.    Drink extra fluids (6 to 8 glasses of liquids each day), unless a doctor has told you not to. Ask your care team which fluids are best for you. Avoid fluids that contain caffeine or alcohol.    Take over-the-counter (OTC) pain medicine to reduce pain and fever. Your care team will tell you which medicine to use.  If you are very sick, you may need to stay in the hospital. Your care may include:    IV (intravenous) fluids. We may give you fluids through a needle in your vein to keep hydrated..    Oxygen. To make sure your body gets enough oxygen, we may give you an oxygen mask or a breathing machine (ventilator).    Prone positioning. We may turn you onto your stomach. This will increase the oxygen in your lungs.    Remdesivir. We may give you a medicine through a vein (IV medicine) called remdesivir. It works by stopping the spread of the virus in the body. It's FDA-approved for people being treated in the hospital. This medicine is for those 12 years and older who weigh more than about 88 pounds (40 kgs). In certain cases, it may also be used for people younger than 12 years or who weigh less than about 88 pounds (40 kgs).  Experts are researching experimental treatments as well.  These include:    COVID-19 convalescent plasma. Those who have recovered from COVID-19 may be asked to donate plasma. The plasma may contain antibodies to help fight the virus in others. Experts don't know if the plasma will work well as a treatment. Research continues, and the FDA has approved it for emergency use in certain people with serious or life-threatening COVID-19. For more information, talk to your care team.    Bamlanivimab. The FDA recently approved this treatment (monoclonal antibody therapy) for emergency use for certain people who have COVID-19 but are not in the hospital. It's not widely available and is still being investigated. It s approved for people 12 years and older who weigh about 88 pounds (40 kgs) and are at high risk for severe COVID-19 and a hospital stay. This includes people who are 65 years or older and people with certain chronic conditions.  Are you at risk for COVID-19?  Some studies suggest that people without symptoms may spread COVID-19.  You re at risk for getting COVID-19 if:    You live in (or recently traveled to) an area with a COVID-19 outbreak.    You had close contact with someone who has or may have COVID-19. Close contact means being within 6 feet for a total of 15 minutes or more. This includes several short contacts that add up to at least 15 minutes over a 24-hour period.  Date last modified: 1/15/2021  [employer] last reviewed this educational content on 1/1/2020  This information has been modified by your health care provider with permission from the publisher.    [id number] [employer]. [address] [place] [address], [place] [address]. All rights reserved. This information is not intended as a substitute for professional medical care. Always follow your healthcare professional's instructions.           Patient Education   Sign Up for [employer] Loop  COVID-19 Symptoms  We know it's scary to hear you might have COVID-19. We want to track your symptoms to make  sure you're OK over the next 2 weeks.    Please look for an email from [employer]. This is a free, online program that we'll use to keep in touch. To sign up, click the link in the email you get.    If you don't get an email, please call your [facility] clinic. Ask them to sign you up for [employer].    You can learn more at [email or web address].  For informational purposes only. Not to replace the advice of your health care provider.   Copyright   2020 [facility]. Clinically reviewed by Dahlia Kelley. All rights reserved. [employer] [id number] - 04/20.

## 2021-02-19 ENCOUNTER — VIRTUAL VISIT (OUTPATIENT)
Dept: URGENT CARE | Facility: CLINIC | Age: 46
End: 2021-02-19
Payer: COMMERCIAL

## 2021-02-19 ENCOUNTER — APPOINTMENT (OUTPATIENT)
Dept: GENERAL RADIOLOGY | Facility: CLINIC | Age: 46
End: 2021-02-19
Attending: EMERGENCY MEDICINE
Payer: COMMERCIAL

## 2021-02-19 ENCOUNTER — HOSPITAL ENCOUNTER (EMERGENCY)
Facility: CLINIC | Age: 46
Discharge: HOME | End: 2021-02-19
Attending: EMERGENCY MEDICINE | Admitting: EMERGENCY MEDICINE
Payer: COMMERCIAL

## 2021-02-19 VITALS
HEART RATE: 86 BPM | HEIGHT: 68 IN | WEIGHT: 195 LBS | SYSTOLIC BLOOD PRESSURE: 131 MMHG | TEMPERATURE: 97 F | OXYGEN SATURATION: 99 % | RESPIRATION RATE: 16 BRPM | BODY MASS INDEX: 29.55 KG/M2 | DIASTOLIC BLOOD PRESSURE: 88 MMHG

## 2021-02-19 DIAGNOSIS — U07.1 2019 NOVEL CORONAVIRUS DISEASE (COVID-19): Primary | ICD-10-CM

## 2021-02-19 DIAGNOSIS — R07.9 CHEST PAIN, UNSPECIFIED TYPE: ICD-10-CM

## 2021-02-19 DIAGNOSIS — U07.1 COVID-19: ICD-10-CM

## 2021-02-19 LAB
ANION GAP SERPL CALCULATED.3IONS-SCNC: 5 MMOL/L (ref 3–14)
BASOPHILS # BLD AUTO: 0 10E9/L (ref 0–0.2)
BASOPHILS NFR BLD AUTO: 0.3 %
BUN SERPL-MCNC: 12 MG/DL (ref 7–30)
CALCIUM SERPL-MCNC: 9.2 MG/DL (ref 8.5–10.1)
CHLORIDE SERPL-SCNC: 103 MMOL/L (ref 94–109)
CO2 SERPL-SCNC: 29 MMOL/L (ref 20–32)
CREAT SERPL-MCNC: 1.25 MG/DL (ref 0.66–1.25)
D DIMER PPP FEU-MCNC: 0.3 UG/ML FEU (ref 0–0.5)
DIFFERENTIAL METHOD BLD: ABNORMAL
EOSINOPHIL # BLD AUTO: 0 10E9/L (ref 0–0.7)
EOSINOPHIL NFR BLD AUTO: 0 %
ERYTHROCYTE [DISTWIDTH] IN BLOOD BY AUTOMATED COUNT: 12.2 % (ref 10–15)
GFR SERPL CREATININE-BSD FRML MDRD: 69 ML/MIN/{1.73_M2}
GLUCOSE SERPL-MCNC: 80 MG/DL (ref 70–99)
HCT VFR BLD AUTO: 45.1 % (ref 40–53)
HGB BLD-MCNC: 15.5 G/DL (ref 13.3–17.7)
IMM GRANULOCYTES # BLD: 0 10E9/L (ref 0–0.4)
IMM GRANULOCYTES NFR BLD: 0.3 %
INTERPRETATION ECG - MUSE: NORMAL
LYMPHOCYTES # BLD AUTO: 1.3 10E9/L (ref 0.8–5.3)
LYMPHOCYTES NFR BLD AUTO: 36.4 %
MCH RBC QN AUTO: 30.3 PG (ref 26.5–33)
MCHC RBC AUTO-ENTMCNC: 34.4 G/DL (ref 31.5–36.5)
MCV RBC AUTO: 88 FL (ref 78–100)
MONOCYTES # BLD AUTO: 0.5 10E9/L (ref 0–1.3)
MONOCYTES NFR BLD AUTO: 12.9 %
NEUTROPHILS # BLD AUTO: 1.8 10E9/L (ref 1.6–8.3)
NEUTROPHILS NFR BLD AUTO: 50.1 %
NRBC # BLD AUTO: 0 10*3/UL
NRBC BLD AUTO-RTO: 0 /100
PLATELET # BLD AUTO: 118 10E9/L (ref 150–450)
POTASSIUM SERPL-SCNC: 3.7 MMOL/L (ref 3.4–5.3)
RBC # BLD AUTO: 5.11 10E12/L (ref 4.4–5.9)
SODIUM SERPL-SCNC: 137 MMOL/L (ref 133–144)
TROPONIN I SERPL-MCNC: <0.015 UG/L (ref 0–0.04)
WBC # BLD AUTO: 3.6 10E9/L (ref 4–11)

## 2021-02-19 PROCEDURE — 99285 EMERGENCY DEPT VISIT HI MDM: CPT | Mod: 25

## 2021-02-19 PROCEDURE — 80048 BASIC METABOLIC PNL TOTAL CA: CPT | Performed by: EMERGENCY MEDICINE

## 2021-02-19 PROCEDURE — 93005 ELECTROCARDIOGRAM TRACING: CPT

## 2021-02-19 PROCEDURE — 99207 PR NO CHARGE LOS: CPT | Performed by: FAMILY MEDICINE

## 2021-02-19 PROCEDURE — 250N000011 HC RX IP 250 OP 636: Performed by: EMERGENCY MEDICINE

## 2021-02-19 PROCEDURE — 71045 X-RAY EXAM CHEST 1 VIEW: CPT

## 2021-02-19 PROCEDURE — 85025 COMPLETE CBC W/AUTO DIFF WBC: CPT | Performed by: EMERGENCY MEDICINE

## 2021-02-19 PROCEDURE — 96374 THER/PROPH/DIAG INJ IV PUSH: CPT

## 2021-02-19 PROCEDURE — 84484 ASSAY OF TROPONIN QUANT: CPT | Performed by: EMERGENCY MEDICINE

## 2021-02-19 PROCEDURE — 85379 FIBRIN DEGRADATION QUANT: CPT | Performed by: EMERGENCY MEDICINE

## 2021-02-19 RX ORDER — KETOROLAC TROMETHAMINE 15 MG/ML
15 INJECTION, SOLUTION INTRAMUSCULAR; INTRAVENOUS ONCE
Status: COMPLETED | OUTPATIENT
Start: 2021-02-19 | End: 2021-02-19

## 2021-02-19 RX ADMIN — KETOROLAC TROMETHAMINE 15 MG: 15 INJECTION, SOLUTION INTRAMUSCULAR; INTRAVENOUS at 14:57

## 2021-02-19 ASSESSMENT — ENCOUNTER SYMPTOMS
SEIZURES: 0
FEVER: 0
EYE REDNESS: 0
COUGH: 1
SHORTNESS OF BREATH: 1
ABDOMINAL PAIN: 0

## 2021-02-19 ASSESSMENT — MIFFLIN-ST. JEOR: SCORE: 1744.01

## 2021-02-19 NOTE — PROGRESS NOTES
Subjective   HPI     Symptoms started 4 days ago     Tested positive for covid yesterday morning  Symptoms have been going up and down with fever and fatigue  94% pulse oxygen in some times yesterday   Temp 101  Currently 96% pulse oxygen today   Some exhaustion with minor exertion  Some chest tightness - reminds him of when he had walking pneumonia     Pulse rate:  at rest   But with exertion goes up to 150   Temp 98 but currently on ibuprofen     With exertion pulse ox 92  Pulse rate 105     Patient Active Problem List   Diagnosis     Internal hemorrhoids     Premature ejaculation     CARDIOVASCULAR SCREENING; LDL GOAL LESS THAN 160     Rectal fistula     Overweight (BMI 25.0-29.9)     Hematospermia     Anal fissure     Past Surgical History:   Procedure Laterality Date     ADENOIDECTOMY       EXAM UNDER ANESTHESIA RECTUM  8/25/2012    Procedure: EXAM UNDER ANESTHESIA RECTUM;  EXAM UNDER ANESTHESIA, NO EVIDENCE OF FISTULA FOUND;  Surgeon: Goldberg, Stanley Morton, MD;  Location: [facility]     FISTULOTOMY RECTUM  12/10/2011    Procedure:FISTULOTOMY RECTUM; Surgeon:GOLDBERG, STANLEY MORTON; Location:[facility]     FISTULOTOMY RECTUM  2/9/2013    Procedure: FISTULOTOMY RECTUM;  FISTULOTOMY;  Surgeon: Goldberg, Stanley Morton, MD;  Location: [facility] COLONOSCOPY THRU STOMA, DIAGNOSTIC  2009    repeat age 50     REMV PILONIDAL LESION SIMPLE         Social History     Tobacco Use     Smoking status: Current Every Day Smoker     Packs/day: 0.50     Years: 20.00     Pack years: 10.00     Types: Cigarettes     Smokeless tobacco: Never Used   Substance Use Topics     Alcohol use: Yes     Frequency: 2-4 times a month     Family History   Problem Relation Age of Onset     Hypertension Mother      Lung Cancer Mother      Prostate Cancer Maternal Uncle         ~52     Prostate Cancer Paternal Grandfather      Alzheimer Disease Maternal Grandfather      Cancer - colorectal No family hx of      Gastrointestinal Disease No family  hx of      Diabetes No family hx of      C.A.D. No family hx of      Asthma No family hx of      Cerebrovascular Disease No family hx of            Reviewed and updated as needed this visit by Provider                 Review of Systems   Constitutional, HEENT, cardiovascular, pulmonary, GI, , musculoskeletal, neuro, skin, endocrine and psych systems are negative, except as otherwise noted.       Objective   Reported vitals:  There were no vitals taken for this visit.   healthy, alert and no distress  PSYCH: Alert and oriented times 3; coherent speech, normal   rate and volume, able to articulate logical thoughts, able   to abstract reason, no tangential thoughts, no hallucinations   or delusions  His affect is normal  RESP: No cough, no audible wheezing, able to talk in full sentences  Additional exam:  none  Remainder of exam unable to be completed due to telephone visits    Diagnostic Test Results:  Labs reviewed in Epic  none         Assessment/Plan:      ICD-10-CM    1. 2019 novel coronavirus disease (COVID-19)  U07.1    2. Chest pain, unspecified type  R07.9      Chest tightness most severe last night  Also with exertion  Concern for acute cardiorespiratory process - rule out MI rule out PE   Recommend ASAP ER evaluation  Patient declined ambulance, risks discussed    Video done via:  [employer]  Time start : 10:53 am  Time end : 11:06 am  Originating site: patient home   Provider location: provider home     ]  Marley Woo MD

## 2021-02-19 NOTE — ED PROVIDER NOTES
"  History   Chief Complaint:  Chest Pain and Shortness of Breath     HPI   Andre Rose is a 45 year old male who presents with chest pain and shortness of breath. The patient has had symptoms including slight changes in taste and smell, coughing, congestion, and slight shortness of breath. There are no known contacts at risk. He received a positive COVID test result yesterday. Upon experiencing chest tightness, he presented to the ED.     Cardiac Risk Factors:  The patient denies a history of diabetes, hypertension, high cholesterol, known cardiac disease or current smoking.    Review of Systems   Constitutional: Negative for fever.   HENT:        Slight loss of smell   Eyes: Negative for redness.   Respiratory: Positive for cough and shortness of breath.    Cardiovascular: Positive for chest pain.   Gastrointestinal: Negative for abdominal pain.   Neurological: Negative for seizures.   All other systems reviewed and are negative.    Allergies:  Penicillins    Medications:  Varenicline    Past Medical History:    Anal fistula  Epididymitis, bilateral  Headaches  Hematospermia  Orchitis, epididymitis, and epididymo-orchitis, with abscess  Penile discharge  Premature ejaculation  Snores   Anal fissure  Internal hemorrhoids     Past Surgical History:    Adenoidectomy  Fistulotomy rectum (x2)  Remv pilonidal lesion simple     Family History:    Mother: hypertension, lung cancer    Social History:  PCP: Conor Corea  Presents alone  Former smoker    Physical Exam     Patient Vitals for the past 24 hrs:   BP Temp Temp src Pulse Resp SpO2 Height Weight   02/19/21 1146 131/88 97  F (36.1  C) Temporal 86 16 99 % 1.727 m (5' 8\") 88.5 kg (195 lb)       Physical Exam  Physical Exam   General:  Sitting on bed by self, comfortable appearing.   HENT:  No obvious trauma to head  Right Ear:  External ear normal.   Left Ear:  External ear normal.   Nose:  Nose normal.   Eyes:  Conjunctivae and EOM are normal.  Neck: Normal range " of motion. Neck supple. No tracheal deviation present.   Pulm/Chest: No respiratory distress  M/S: Normal range of motion.   Neuro: Alert. GCS 15.  Skin: Skin is warm and dry. No rash noted. Not diaphoretic.   Psych: Normal mood and affect. Behavior is normal.     Emergency Department Course   ECG  ECG taken at 1208, ECG read at 1209.  Normal sinus rhythm. Normal ECG.    Rate 84 bpm. MO interval 168 ms. QRS duration 92 ms. QT/QTc 370/437 ms. P-R-T axes 40 30 26.     Imaging:  Xray Chest Port 1 View:  No acute cardiopulmonary disease. As per radiology.    Laboratory:  D Dimer Quantitative: 0.3  CBC: WBC: 3.6 (L), HGB: 15.5, PLT: 118 (L)  BMP: WNL (Creatinine: 1.25)  (1203) Troponin: <0.015    Emergency Department Course:    Reviewed:  I reviewed nursing notes, vitals and past medical history    Assessments:  1205 I obtained history and examined the patient as noted above.   1441 I rechecked the patient and explained findings. Patient ambulated, lowest reading was 94%    Interventions:  1457 Toradol 15 mg IV    Disposition:  The patient was discharged to home.     Impression & Plan     Medical Decision Making:  Andre Rose is a very pleasant 45 year old year old patient who presents to the emergency department with concern of chest tightness.  The patient has had symptoms for 3 days and tested positive for COVID-19 disease yesterday.  He has no known contacts.  The patient has a home pulse oximeter.  The lowest number he has seen is 90% yesterday.  Today the patient is ambulated around the room his pulse oximetry his lowest reading was 94%.  He is not hypoxic.  At rest his pulse oximetry is 100%.  With chest tightness, I obtained an EKG and troponin.  These are both negative.  I doubt non-STEMI/ACS/myocarditis.  A D-dimer was obtained and found to be negative; therefore, I doubt pulmonary embolism.  Remainder of the labs are unremarkable. The chest xray was reviewed and shows no evidence of pneumothorax,  infiltrate to suggest pneumonia, widened mediastinum to suggest aortic dissection, obvious rib fracture or free air under the diaphragm to suggest perforated viscous ulcer.  At this time I suspect his symptoms are related to COVID-19 disease.  He does not require admission to the hospital, but I discussed he should return with any pulse oximetry reading less than 90% or worsening symptoms.  He consented for dose of Toradol prior to discharge.  Recommended Tylenol, ibuprofen, etc.    The treatment plan was discussed with the patient and they expressed understanding of this plan and consented to the plan.  In addition, the patient will return to the emergency department if their symptoms persist, worsen, if new symptoms arise or if there is any concern as other pathology may be present that is not evident at this time. They also understand the importance of close follow up in the clinic and if unable to do so will return to the emergency department for a reevaluation. All questions were answered.    Covid-19  Andre Rose was evaluated during a global COVID-19 pandemic, which necessitated consideration that the patient might be at risk for infection with the SARS-CoV-2 virus that causes COVID-19.   Applicable protocols for evaluation were followed during the patient's care. COVID-19 was considered as part of the patient's evaluation. A test was obtained at a previous visit and reviewed & considered today.    Diagnosis:    ICD-10-CM    1. COVID-19  U07.1      Scribe Disclosure:  Jo MALLORY, am serving as a scribe at 12:48 PM on 2/19/2021 to document services personally performed by Jose Tubbs DO, based on my observations and the provider's statements to me.            Jose Tubbs DO  02/19/21 1250

## 2021-02-19 NOTE — DISCHARGE INSTRUCTIONS
Discharge Instructions  COVID-19    COVID-19 is the disease caused by a new coronavirus. The virus spreads from person-to-person primarily by droplets when an infected person coughs or sneezes and the droplet either lands on another person or that other person touches a surface with the droplet on it. There are tests available to diagnose COVID-19. There is no specific treatment or medicine for the disease.    You may have been diagnosed with COVID, may be being tested for COVID and have a pending test result, or may have been exposed to COVID.    Symptoms of COVID-19    Many people have no symptoms or mild symptoms.  Symptoms may usually appear 4 to 5 days (up to 14 days) after contact with a person with COVID-19. Some people will get severe symptoms and pneumonia. Usual symptoms are:     ? Fever  ? Cough  ? Trouble breathing    Less common symptoms are: Headache, body aches, sore throat, sneezing, diarrhea, loss of taste or smell.    Isolation and Quarantine    You were seen because you have symptoms, had an exposure, or had some other concern about possible COVID. The best way to stop the spread of the virus is to avoid contact with others.  Isolation refers to sick people staying away from people who are not sick. A person in quarantine is limiting activity because they were exposed and are waiting to see if they might become sick.    If you test positive for COVID, you should stay home (isolation) for at least 10 days after your symptoms began, and for 24 hours with no fever and improvement of symptoms--whichever is longer. (Your fever should be gone for 24 hours without using fever-reducing medicine). If you have no symptoms, you should stay home (isolation) for 10 days from the day of the test.    For example, if you have a fever and cough for 6 days, you need to stay home 4 more days with no fever for a total of 10 days. Or, if you have a fever and cough for 10 days, you need to stay home one more day with  no fever for a total of 11 days.    If you have a high-risk exposure to COVID (you spent 15 minutes or more within six feet of somebody who has COVID), you should stay home (quarantine) for 14 days. Even if you test negative for COVID, the CDC recommends a 14-day quarantine from the time of your last exposure to that individual. There are options for a shortened (<14 day quarantine) you can review at:    https://[email or web address].[place]./diseases/coronavirus/close.html#long    If you have symptoms but a negative test, you should stay at home until you are symptom-free and without fever for 24 hours, using the same judgment you would for when it is safe to return to work/school from strep throat, influenza, or the common cold. If you worsen, you should consider being re-evaluated.    If you are being tested for COVID and your test is pending, you should stay home until you know your test result.    How should I protect myself and others?    Do not go to work or school. Have a friend or relative do your shopping. Do not use public transportation (bus, train) or ridesharing (Lyft, Uber).    Separate yourself from other people in your home. As much as possible, you should stay in one room and away from other people in your home. Also, use a separate bathroom, if possible. Avoid handling pets or other animals while sick.     Wear a facemask if you need to be around other people and cover your mouth and nose with a tissue when you cough or sneeze.     Avoid sharing personal household items. You should not share dishes, drinking glasses, forks/knives/spoons, towels, or bedding with other people in your home. After using these items, they should be washed with soap and water. Clean parts of your home that are touched often (doorknobs, faucets, countertops, etc.) daily.     Wash your hands often with soap and water for at least 20 seconds or use an alcohol-based hand  containing at least 60% alcohol.     Avoid touching  your face.    Treat your symptoms. You can take Acetaminophen (Tylenol) to treat body aches and fever as needed for comfort. Ibuprofen (Advil or Motrin) can be used as well if you still have symptoms after taking Tylenol. Drink fluids. Rest.    Watch for worsening symptoms such as shortness of breath/difficulty breathing or very severe weakness.    Employers/workplaces are being asked by the Centers for Disease Control (CDC) to not request notes/documentation for you to return to work or prove that you were ill. You may choose to show your employer this paperwork. Also, repeat testing should not be required to return to work.    Return to the Emergency Department if:    If you are developing worsening breathing, shortness of breath, or feel worse you should seek medical attention.  If you are uncertain, contact your health care provider/clinic. If you need emergency medical attention, call 911 and tell them you have been ill.

## 2021-02-21 ENCOUNTER — VIRTUAL VISIT (OUTPATIENT)
Dept: URGENT CARE | Facility: CLINIC | Age: 46
End: 2021-02-21
Payer: COMMERCIAL

## 2021-02-21 DIAGNOSIS — I80.9 SUPERFICIAL PHLEBITIS: Primary | ICD-10-CM

## 2021-02-21 PROCEDURE — 99213 OFFICE O/P EST LOW 20 MIN: CPT | Mod: 95

## 2021-02-21 NOTE — PROGRESS NOTES
Andre is a 45 year old who is being evaluated via a billable video visit.      How would you like to obtain your AVS? sailsquare    Video Start Time: 1103      Subjective   Andre is a 45 year old who presents for the following health issues foot concern    HPI   Patient was diagnosed with Covid last week and has been ill with fevers and fatigue. Yesterday into this morning he noted an area of swelling and tenderness to the top of his foot at the ankle. He says there is one vein that seems swollen and tender. He denies any swelling to the foot, ankle or calf, he denies any redness or warmth to the area. He os not having calf pain to palpation or movement. He does not have a history of blood clots.       Review of Systems   Constitutional, HEENT, cardiovascular, pulmonary, gi and gu systems are negative, except as otherwise noted.      Objective           Vitals:  No vitals were obtained today due to virtual visit.    Physical Exam   GENERAL: Healthy, alert and no distress  EYES: Eyes grossly normal to inspection.  No discharge or erythema, or obvious scleral/conjunctival abnormalities.  RESP: No audible wheeze, cough, or visible cyanosis.  No visible retractions or increased work of breathing.    SKIN: there is some swelling to the vein on the top of the foot, no surrounding edema or erythema  NEURO: Cranial nerves grossly intact.  Mentation and speech appropriate for age.  PSYCH: Mentation appears normal, affect normal/bright, judgement and insight intact, normal speech and appearance well-groomed.    ASSESSMENT AND PLAN    ICD-10-CM    1. Superficial phlebitis  I80.9      I discussed symptoms and concerns with patient and will have him use heat packs and monitor. If he develops pain or swelling in the calf I would have him be seen in person for further evaluation. If he develops any signs of infection also follow up for in person evaluation.       Video-Visit Details    Type of service:  Video Visit    Video End  Time:1109    Originating Location (pt. Location): [place]    Delaney Kellogg PA-C    Distant Location (provider location):  [facility]     Platform used for Video Visit: Glenda

## 2021-02-21 NOTE — PATIENT INSTRUCTIONS
Patient Education     Superficial Thrombophlebitis   The superficial veins are the veins near the surface of the skin. Superficial thrombophlebitis is a problem that occurs when one or more of these veins become red, irritated, and swollen. This is most often because of a blood clot.   Causes  The problem may occur after injury to a vein. It may also occur after having an intravenous (IV) line placed. Other factors that can make the problem more likely include:     Varicose veins    Venous insufficiency    Bleeding disorders    Prolonged periods of rest and not moving around    IV drug abuse    Pregnancy    Use of birth control pills or estrogen therapy  Symptoms  Symptoms may appear in the affected area. They can include:    Pain    Tenderness    Redness    Warmth    Swelling    Hardening of the vein  In most cases, superficial thrombophlebitis resolves on its own with no problems. Treatment is focused on relieving symptoms.   Sometimes, there is a risk that the deep veins in the body may also be involved. This can lead to more serious problems. In such cases, further testing and treatments may be needed. Your healthcare provider can tell you more about this.   Home care  To help relieve pain and swelling, you may be told to:    Apply heat or cold to the affected area. Do this for up to 10 minutes as often as directed.  ? Heat: Use a warm compress, such as a heating pad.  ? Cold: Use a cold compress, such as a cold pack or bag of ice wrapped in a thin towel.    Take nonsteroidal anti-inflammatory drugs (NSAIDS), such as ibuprofen. In some cases, other pain medicines may be prescribed.    Keep the affected limb (arm or leg) raised above heart level as directed.    Wear elastic compression stockings or bandages as directed.    Don't sit or stand for long periods. Get up and walk often.  To help treat a blood clot, a blood thinner (anticoagulant) may be prescribed. If this is needed, be sure to take the medicine  exactly as directed.   Follow-up care  Follow up with your healthcare provider as advised. If imaging tests are done, they will be reviewed by a doctor. You ll be told the results and any new findings that may affect your treatment.   When to seek medical advice  Call your healthcare provider right away if any of these occur:    Fever of 100.4 F (38 C) or higher, or as directed by your healthcare provider    Increasing pain, swelling, or tenderness in the affected area    Spreading warmth or redness in the affected area  Call 911  Call 911 if any of these occur:     Trouble breathing    Chest pain or discomfort that worsens with deep breathing or coughing    Coughing (may cough up blood)    Fast or irregular heartbeat    Sweating    Anxiety    Lightheadedness, dizziness, or fainting    Extreme confusion    Extreme drowsiness or trouble waking up    New pain in the chest, arm, shoulder, neck, or upper back  [employer] last reviewed this educational content on 4/1/2018 2000-2020 [email or web address]. [address] [place] [address], [place] [address]. All rights reserved. This information is not intended as a substitute for professional medical care. Always follow your healthcare professional's instructions.

## 2021-02-22 ENCOUNTER — HOSPITAL ENCOUNTER (EMERGENCY)
Facility: CLINIC | Age: 46
Discharge: HOME | End: 2021-02-22
Attending: EMERGENCY MEDICINE | Admitting: EMERGENCY MEDICINE
Payer: COMMERCIAL

## 2021-02-22 ENCOUNTER — VIRTUAL VISIT (OUTPATIENT)
Dept: URGENT CARE | Facility: CLINIC | Age: 46
End: 2021-02-22
Payer: COMMERCIAL

## 2021-02-22 ENCOUNTER — APPOINTMENT (OUTPATIENT)
Dept: ULTRASOUND IMAGING | Facility: CLINIC | Age: 46
End: 2021-02-22
Attending: EMERGENCY MEDICINE
Payer: COMMERCIAL

## 2021-02-22 VITALS
DIASTOLIC BLOOD PRESSURE: 77 MMHG | SYSTOLIC BLOOD PRESSURE: 114 MMHG | TEMPERATURE: 98.2 F | OXYGEN SATURATION: 96 % | BODY MASS INDEX: 29.55 KG/M2 | HEART RATE: 88 BPM | HEIGHT: 68 IN | RESPIRATION RATE: 17 BRPM | WEIGHT: 195 LBS

## 2021-02-22 DIAGNOSIS — M79.89 LEG SWELLING: ICD-10-CM

## 2021-02-22 DIAGNOSIS — U07.1 2019 NOVEL CORONAVIRUS DISEASE (COVID-19): ICD-10-CM

## 2021-02-22 DIAGNOSIS — M79.89 CALF SWELLING: Primary | ICD-10-CM

## 2021-02-22 LAB
ALBUMIN SERPL-MCNC: 3.5 G/DL (ref 3.4–5)
ALP SERPL-CCNC: 49 U/L (ref 40–150)
ALT SERPL W P-5'-P-CCNC: 28 U/L (ref 0–70)
ANION GAP SERPL CALCULATED.3IONS-SCNC: 6 MMOL/L (ref 3–14)
AST SERPL W P-5'-P-CCNC: 25 U/L (ref 0–45)
BASOPHILS # BLD AUTO: 0 10E9/L (ref 0–0.2)
BASOPHILS NFR BLD AUTO: 0 %
BILIRUB SERPL-MCNC: 0.5 MG/DL (ref 0.2–1.3)
BUN SERPL-MCNC: 12 MG/DL (ref 7–30)
CALCIUM SERPL-MCNC: 8.8 MG/DL (ref 8.5–10.1)
CHLORIDE SERPL-SCNC: 104 MMOL/L (ref 94–109)
CO2 SERPL-SCNC: 26 MMOL/L (ref 20–32)
CREAT SERPL-MCNC: 1.05 MG/DL (ref 0.66–1.25)
D DIMER PPP FEU-MCNC: 0.5 UG/ML FEU (ref 0–0.5)
DIFFERENTIAL METHOD BLD: ABNORMAL
EOSINOPHIL # BLD AUTO: 0 10E9/L (ref 0–0.7)
EOSINOPHIL NFR BLD AUTO: 0 %
ERYTHROCYTE [DISTWIDTH] IN BLOOD BY AUTOMATED COUNT: 12 % (ref 10–15)
GFR SERPL CREATININE-BSD FRML MDRD: 85 ML/MIN/{1.73_M2}
GLUCOSE SERPL-MCNC: 96 MG/DL (ref 70–99)
HCT VFR BLD AUTO: 37.5 % (ref 40–53)
HGB BLD-MCNC: 13.3 G/DL (ref 13.3–17.7)
IMM GRANULOCYTES # BLD: 0 10E9/L (ref 0–0.4)
IMM GRANULOCYTES NFR BLD: 0.5 %
INTERPRETATION ECG - MUSE: NORMAL
LYMPHOCYTES # BLD AUTO: 1.1 10E9/L (ref 0.8–5.3)
LYMPHOCYTES NFR BLD AUTO: 24.3 %
MCH RBC QN AUTO: 30.9 PG (ref 26.5–33)
MCHC RBC AUTO-ENTMCNC: 35.5 G/DL (ref 31.5–36.5)
MCV RBC AUTO: 87 FL (ref 78–100)
MONOCYTES # BLD AUTO: 0.5 10E9/L (ref 0–1.3)
MONOCYTES NFR BLD AUTO: 10.3 %
NEUTROPHILS # BLD AUTO: 2.8 10E9/L (ref 1.6–8.3)
NEUTROPHILS NFR BLD AUTO: 64.9 %
NRBC # BLD AUTO: 0 10*3/UL
NRBC BLD AUTO-RTO: 0 /100
PLATELET # BLD AUTO: 143 10E9/L (ref 150–450)
POTASSIUM SERPL-SCNC: 3.7 MMOL/L (ref 3.4–5.3)
PROT SERPL-MCNC: 7.2 G/DL (ref 6.8–8.8)
RBC # BLD AUTO: 4.3 10E12/L (ref 4.4–5.9)
SODIUM SERPL-SCNC: 136 MMOL/L (ref 133–144)
TROPONIN I SERPL-MCNC: <0.015 UG/L (ref 0–0.04)
WBC # BLD AUTO: 4.4 10E9/L (ref 4–11)

## 2021-02-22 PROCEDURE — 99207 PR NO CHARGE LOS: CPT | Performed by: NURSE PRACTITIONER

## 2021-02-22 PROCEDURE — 84484 ASSAY OF TROPONIN QUANT: CPT | Performed by: EMERGENCY MEDICINE

## 2021-02-22 PROCEDURE — 93005 ELECTROCARDIOGRAM TRACING: CPT

## 2021-02-22 PROCEDURE — 85379 FIBRIN DEGRADATION QUANT: CPT | Performed by: EMERGENCY MEDICINE

## 2021-02-22 PROCEDURE — 93970 EXTREMITY STUDY: CPT

## 2021-02-22 PROCEDURE — 85025 COMPLETE CBC W/AUTO DIFF WBC: CPT | Performed by: EMERGENCY MEDICINE

## 2021-02-22 PROCEDURE — 99285 EMERGENCY DEPT VISIT HI MDM: CPT | Mod: 25

## 2021-02-22 PROCEDURE — 80053 COMPREHEN METABOLIC PANEL: CPT | Performed by: EMERGENCY MEDICINE

## 2021-02-22 ASSESSMENT — MIFFLIN-ST. JEOR: SCORE: 1744.01

## 2021-02-22 ASSESSMENT — ENCOUNTER SYMPTOMS: SHORTNESS OF BREATH: 1

## 2021-02-22 NOTE — PROGRESS NOTES
SUBJECTIVE:   Andre Rose is a 45 year old male presenting with a chief complaint of swelling and veins more prominent in calf bilaterally.   Diagnosed with Covid last week and has been ill with fevers and fatigue. 2 days ago he noted an area of swelling and tenderness to the top of his foot at the ankle. One vein that seems swollen and tender. He denies any swelling to the foot, ankle or calf, he denies any redness or warmth to the area. He does not having calf pain to palpation or movement, no injury. He does not have a history of blood clots. Denies chest pain sob. 02 has been intermittent 92 today (as high as 94-95)  Does smoke.        Past Medical History:   Diagnosis Date     Anal fistula      Blood in semen      Epididymitis, bilateral      Former smoker quit 3/10    16 pack year history     Headaches      Hematospermia      Orchitis, epididymitis, and epididymo-orchitis, with abscess      Overweight (BMI 25.0-29.9)      Penile discharge      Premature ejaculation 10/10/2007     Snores      Current Outpatient Medications   Medication Sig Dispense Refill     acetaminophen (TYLENOL) 500 MG tablet Take 1 tablet (500 mg) by mouth every 8 hours as needed for mild pain 30 tablet 0     benzonatate (TESSALON) 100 MG capsule Take 1 capsule (100 mg) by mouth 3 times daily as needed for cough 30 capsule 0     varenicline (CHANTIX STARTING MONTH PAK) 0.5 MG X 11 & 1 MG X 42 tablet Take 0.5 mg tab daily for 3 days, then 0.5 mg tab twice daily for 4 days, then 1 mg twice daily. (Patient not taking: Reported on 8/11/2020) 53 tablet 0     Social History     Tobacco Use     Smoking status: Current Every Day Smoker     Packs/day: 0.50     Years: 20.00     Pack years: 10.00     Types: Cigarettes     Smokeless tobacco: Never Used   Substance Use Topics     Alcohol use: Yes     Frequency: 2-4 times a month       ROS:  Review of systems negative except as stated above.    OBJECTIVE:  GENERAL APPEARANCE: alert and no  distress  RESP: lungs clear  CV: regular rates and rhythm,  NEURO: Normal strength and tone, sensory exam grossly normal,  normal speech and mentation  SKIN: no suspicious lesions or rashes, bilaterally calf swelling and veins prominent    ASSESSMENT:  (M79.89) Calf swelling  (primary encounter diagnosis)    PLAN:  Will be seen in person to rule out dvt    Telephone time spent 11 minutes    SARA Armendariz CNP

## 2021-02-22 NOTE — ED PROVIDER NOTES
"  History   Chief Complaint:  Leg Swelling    HPI   Andre Rose is a 45 year old male with history of COVID-19 who presents with leg swelling. The patient was diagnosed with COVID-19 last week and was sent to the ED after a virtual visit. He states that both of his legs are swollen, and he does have a vein that is more prominent. He notes some shortness of breath as well. He denies chest pain or any pain, redness, or warmth to the leg. He also denies any falls or injuries.    Review of Systems   Respiratory: Positive for shortness of breath.    Cardiovascular: Positive for leg swelling. Negative for chest pain.   All other systems reviewed and are negative.    Allergies:  Penicillins      Medications:  Tessalon      Past Medical History:    Anal fistula  Epididymitis, orchitis, bilateral  Former smoker  Headaches  Hematospermia  Snores  Anal fissure       Past Surgical History:    Adenoidectomy  Exam under anesthesia rectum  Fistulotomy rectum x 2  Colonoscopy thru stoma, diagnostic  Remv pilonidal lesion simple       Family History:    Hypertension  Lung cancer      Social History:  Unaccompanied to ED.    Physical Exam     Patient Vitals for the past 24 hrs:   BP Temp Temp src Pulse Resp SpO2 Height Weight   02/22/21 1100 -- -- -- -- -- 96 % -- --   02/22/21 1030 -- -- -- -- -- 94 % -- --   02/22/21 0930 114/77 -- -- 88 -- 95 % -- --   02/22/21 0928 127/83 98.2  F (36.8  C) Oral 87 17 95 % 1.727 m (5' 8\") 88.5 kg (195 lb)   02/22/21 0920 -- -- -- 85 18 97 % -- --     Physical Exam  General: Alert, appears well-developed and well-nourished. Cooperative.     In mild distress, anxious.   HEENT:  Head:  Atraumatic  Ears:  External ears are normal  Mouth/Throat:  Oropharynx is without erythema or exudate and mucous membranes are moist.   Eyes:   Conjunctivae normal and EOM are normal. No scleral icterus.  CV:  Normal rate, regular rhythm, normal heart sounds and radial pulses are 2+ and symmetric.  No " murmur.  Resp:  Breath sounds are clear bilaterally    Non-labored, no retractions or accessory muscle use  GI:  Abdomen is soft, no distension, no tenderness. No rebound or guarding.  No CVA tenderness bilaterally  MS:  Normal range of motion. No edema.  Mildly prominent vein over medial aspect of left ankle.  Non tender.  Non indurated.  No overlying redness.      Normal strength in all 4 extremities.     Back atraumatic.    No midline cervical, thoracic, or lumbar tenderness  Skin:  Warm and dry.  No rash or lesions noted.  Neuro: Alert. Normal strength.  GCS: 15  Psych:  Normal mood and affect.    Emergency Department Course   ECG  ECG taken at 0935, ECG read at 0949  Normal sinus rhythm  Nonspecific ST segment abnormalities vs. Early repolarization  Normal ECG  No significant change as compared to prior, dated 2/19/21.  Rate 83 bpm. LA interval 156 ms. QRS duration 84 ms. QT/QTc 376/441 ms. P-R-T axes 51 55 40.     Imaging:  US Lower Extremity Venous Duplex Bilateral  No evidence of deep venous thrombosis in either lower  extremity.   Reading per radiology      Laboratory:  CBC: WBC 4.4, HGB 13.3,  (L)   CMP: AWNL (Creatinine: 1.05)    Troponin (Collected 0951): <0.015  D Dimer (Collected 0951): 0.5    Emergency Department Course:    Reviewed:  I reviewed nursing notes, vitals and past medical history    Assessments:  0922 I obtained history and examined the patient as noted above.   1110 I rechecked the patient and explained findings.     Disposition:  The patient was discharged to home.     Impression & Plan   Medical Decision Making:  Andre Rose is a 45-year-old male with recent diagnosis of COVID-19 who presents with bilateral lower extremity swelling.  Patient shows me pictures at bedside of swelling to some veins of bilateral lower legs.  Reassuringly he has no calf tenderness or evidence of cellulitis on examination.  He has good distal CMS of bilateral lower extremities.  We did obtain  ultrasound out of concern for potential DVT in the setting of known COVID-19 as we do know this is prothrombotic.  Reassuringly no evidence of DVT on ultrasound.  D-dimer negative and reassuringly no active chest pain and lower concern for pulmonary embolism.  Blood work otherwise grossly unremarkable.  EKG shows normal sinus rhythm with no concerning ischemic changes and troponin is undetectable, low concern for ACS.  Continue with outpatient COVID-19 cares.  Close outpatient follow-up with primary care recommended.  After all questions answered and return precautions understood, discharged home.    Covid-19  Andre Rose was evaluated during a global COVID-19 pandemic, which necessitated consideration that the patient might be at risk for infection with the SARS-CoV-2 virus that causes COVID-19.   Applicable protocols for evaluation were followed during the patient's care.   COVID-19 was considered as part of the patient's evaluation. The plan for testing is:  a test was obtained at a previous visit and reviewed & considered today.    Diagnosis:    ICD-10-CM    1. Leg swelling  M79.89    2. 2019 novel coronavirus disease (COVID-19)  U07.1        Discharge Medications:  New Prescriptions    No medications on file       Scribe Disclosure:  Holley MALLORY, am serving as a scribe at 9:21 AM on 2/22/2021 to document services personally performed by Jared Dorado MD based on my observations and the provider's statements to me.        Jared Dorado MD  02/22/21 2251

## 2021-02-22 NOTE — ED TRIAGE NOTES
Had virtual UC visit this AM and sent here.  Dx with COVID last week.  2 days ago noted swelling/tenderness to bilat lower extremities.

## 2021-03-02 ENCOUNTER — OFFICE VISIT (OUTPATIENT)
Dept: FAMILY MEDICINE | Facility: CLINIC | Age: 46
End: 2021-03-02
Payer: COMMERCIAL

## 2021-03-02 VITALS
TEMPERATURE: 96.1 F | HEART RATE: 80 BPM | BODY MASS INDEX: 29.65 KG/M2 | WEIGHT: 195 LBS | SYSTOLIC BLOOD PRESSURE: 110 MMHG | OXYGEN SATURATION: 99 % | DIASTOLIC BLOOD PRESSURE: 74 MMHG

## 2021-03-02 DIAGNOSIS — U07.1 COVID-19 VIRUS INFECTION: ICD-10-CM

## 2021-03-02 DIAGNOSIS — Z71.6 ENCOUNTER FOR SMOKING CESSATION COUNSELING: Primary | ICD-10-CM

## 2021-03-02 PROCEDURE — 99214 OFFICE O/P EST MOD 30 MIN: CPT | Performed by: FAMILY MEDICINE

## 2021-03-02 RX ORDER — ASPIRIN 81 MG/1
81 TABLET ORAL DAILY
COMMUNITY
End: 2024-06-28

## 2021-03-02 RX ORDER — VARENICLINE TARTRATE 0.5 MG/1
0.5 TABLET, FILM COATED ORAL 2 TIMES DAILY
COMMUNITY
End: 2023-05-02

## 2021-03-02 RX ORDER — VARENICLINE TARTRATE 1 MG/1
1 TABLET, FILM COATED ORAL 2 TIMES DAILY
Qty: 30 TABLET | Refills: 1 | Status: SHIPPED | OUTPATIENT
Start: 2021-03-02 | End: 2023-05-02

## 2021-03-02 NOTE — PROGRESS NOTES
"    Assessment & Plan     Encounter for smoking cessation counseling  Patient is very motivated to quit smoking.  He is already on starter pack but would like to continuation.  Medication refilled.        COVID-19 virus infection  Discussed with the patient about course of Covid infection along with recovery.  Sometimes can take up to a month to come feel completely better fatigue tiredness can linger on.  He is reassured, I do not see any swelling in the ankles or legs.  His breathing is normal.  Patient has questions regarding Covid vaccine.  Advised he can take vaccine after 14 days of his infection however he does have some protection for the next few months.  Warning signs were discussed with the patient ,any breathing difficulty, any headaches blurry vision he needs to report back.  Continue social distance wear mask wash her hands repeatedly.               Tobacco Cessation:   reports that he has been smoking cigarettes. He has a 10.00 pack-year smoking history. He has never used smokeless tobacco.       BMI:   Estimated body mass index is 29.65 kg/m  as calculated from the following:    Height as of 2/22/21: 1.727 m (5' 8\").    Weight as of this encounter: 88.5 kg (195 lb).             Conor Corea MD  [facility] CHET Powell is a 45 year old who presents for the following health issues    HPI       ED/UC Followup:    Facility:  [facility]  Date of visit: 2/19 and 2/22  Reason for visit: Initial visit was for cough, shortness of breath and chest pain. POS COVID test 2/18/2021. Returned to ER 2/22 with lower leg swelling and pain/    Current Status: Still c/o slight cough, worse in the evening.    Patient was diagnosed with positive Covid test 2 weeks ago.  You went to the ER secondary to some cough.  He has subjective feeling of leg swelling which was ruled out with ultrasound.  Overall since his diagnosis he is slowly and steadily improving.  Denies any chest pains no residual " shortness of breath.  No calf tenderness.  Patient is a smoker, he would like to quit and requesting a prescription for Chantix.        Review of Systems   Constitutional, HEENT, cardiovascular, pulmonary, gi and gu systems are negative, except as otherwise noted.      Objective    /74   Pulse 80   Temp 96.1  F (35.6  C) (Tympanic)   Wt 88.5 kg (195 lb)   SpO2 99%   BMI 29.65 kg/m    Body mass index is 29.65 kg/m .  Physical Exam   GENERAL: healthy, alert and no distress  NECK: no adenopathy, no asymmetry, masses, or scars and thyroid normal to palpation  RESP: lungs clear to auscultation - no rales, rhonchi or wheezes  CV: regular rate and rhythm, normal S1 S2, no S3 or S4, no murmur, click or rub, no peripheral edema and peripheral pulses strong  ABDOMEN: soft, nontender, no hepatosplenomegaly, no masses and bowel sounds normal  MS: no gross musculoskeletal defects noted, no edema  NEURO: Normal strength and tone, mentation intact and speech normal  No leg swelling noted.

## 2021-04-21 ENCOUNTER — IMMUNIZATION (OUTPATIENT)
Dept: NURSING | Facility: CLINIC | Age: 46
End: 2021-04-21
Payer: COMMERCIAL

## 2021-04-21 PROCEDURE — 91300 PR COVID VAC PFIZER DIL RECON 30 MCG/0.3 ML IM: CPT

## 2021-04-21 PROCEDURE — 0001A PR COVID VAC PFIZER DIL RECON 30 MCG/0.3 ML IM: CPT

## 2021-05-12 ENCOUNTER — IMMUNIZATION (OUTPATIENT)
Dept: NURSING | Facility: CLINIC | Age: 46
End: 2021-05-12
Payer: COMMERCIAL

## 2021-05-12 PROCEDURE — 91300 PR COVID VAC PFIZER DIL RECON 30 MCG/0.3 ML IM: CPT

## 2021-05-12 PROCEDURE — 0002A PR COVID VAC PFIZER DIL RECON 30 MCG/0.3 ML IM: CPT

## 2021-11-30 ENCOUNTER — TELEPHONE (OUTPATIENT)
Dept: UROLOGY | Facility: CLINIC | Age: 46
End: 2021-11-30
Payer: COMMERCIAL

## 2021-11-30 DIAGNOSIS — R97.20 ELEVATED PROSTATE SPECIFIC ANTIGEN (PSA): Primary | ICD-10-CM

## 2021-11-30 NOTE — TELEPHONE ENCOUNTER
[facility] Call Center    Phone Message    May a detailed message be left on voicemail: yes     Reason for Call: Order(s): Other:   Reason for requested:  Andre called for labs needed for yearly follow up with Dr. Bowen.  He recalls Dr. Bowen told him an appointment/exam may not be needed, but the labs would be.  Please add labs/imaging pt may need.  Also call him to discuss if he will need to keep the exam appointment scheduled 3/10/22, or if he can cancel that.  Advised the answer may not be provided until after lab results.  Please call to confirm once labs are added so pt can complete.  Date needed: ASAP  Provider name: Dr. Bowen      Action Taken: Message routed to:  Other: ua uro    Travel Screening: Not Applicable

## 2021-11-30 NOTE — TELEPHONE ENCOUNTER
Spoke to patient . He is overdue for his PSA  And exam . Has made an  Appointment with call center but 1st opening is not until March. He will d his PSA this month if need be may need to move appointment  Up once results on PSA are back.Dahlia Daley LPN

## 2021-12-02 ENCOUNTER — LAB (OUTPATIENT)
Dept: LAB | Facility: CLINIC | Age: 46
End: 2021-12-02
Payer: COMMERCIAL

## 2021-12-02 DIAGNOSIS — R97.20 ELEVATED PROSTATE SPECIFIC ANTIGEN (PSA): ICD-10-CM

## 2021-12-02 LAB — PSA SERPL-MCNC: 6.8 UG/L (ref 0–4)

## 2021-12-02 PROCEDURE — 84153 ASSAY OF PSA TOTAL: CPT

## 2021-12-02 PROCEDURE — 36415 COLL VENOUS BLD VENIPUNCTURE: CPT

## 2021-12-06 ENCOUNTER — TELEPHONE (OUTPATIENT)
Dept: UROLOGY | Facility: CLINIC | Age: 46
End: 2021-12-06
Payer: COMMERCIAL

## 2021-12-06 NOTE — TELEPHONE ENCOUNTER
----- Message from Hamzah Bowen MD sent at 12/6/2021 11:15 AM CST -----  Please move his appointment up to January, he needs a same-day PSA that day

## 2022-01-11 ENCOUNTER — OFFICE VISIT (OUTPATIENT)
Dept: UROLOGY | Facility: CLINIC | Age: 47
End: 2022-01-11
Payer: COMMERCIAL

## 2022-01-11 VITALS
HEIGHT: 68 IN | SYSTOLIC BLOOD PRESSURE: 100 MMHG | OXYGEN SATURATION: 99 % | BODY MASS INDEX: 29.55 KG/M2 | WEIGHT: 195 LBS | HEART RATE: 81 BPM | DIASTOLIC BLOOD PRESSURE: 74 MMHG

## 2022-01-11 DIAGNOSIS — R97.20 ELEVATED PROSTATE SPECIFIC ANTIGEN (PSA): Primary | ICD-10-CM

## 2022-01-11 DIAGNOSIS — N40.0 ENLARGED PROSTATE: ICD-10-CM

## 2022-01-11 LAB
ALBUMIN UR-MCNC: NEGATIVE MG/DL
APPEARANCE UR: CLEAR
BILIRUB UR QL STRIP: NEGATIVE
COLOR UR AUTO: YELLOW
GLUCOSE UR STRIP-MCNC: NEGATIVE MG/DL
HGB UR QL STRIP: NEGATIVE
KETONES UR STRIP-MCNC: NEGATIVE MG/DL
LEUKOCYTE ESTERASE UR QL STRIP: NEGATIVE
NITRATE UR QL: NEGATIVE
PH UR STRIP: 7 [PH] (ref 5–7)
PSA SERPL-MCNC: 6.6 UG/L (ref 0–4)
SP GR UR STRIP: 1.02 (ref 1–1.03)
UROBILINOGEN UR STRIP-ACNC: 0.2 E.U./DL

## 2022-01-11 PROCEDURE — 99214 OFFICE O/P EST MOD 30 MIN: CPT | Performed by: UROLOGY

## 2022-01-11 PROCEDURE — 81003 URINALYSIS AUTO W/O SCOPE: CPT | Mod: QW | Performed by: UROLOGY

## 2022-01-11 PROCEDURE — 84153 ASSAY OF PSA TOTAL: CPT | Performed by: UROLOGY

## 2022-01-11 PROCEDURE — 36415 COLL VENOUS BLD VENIPUNCTURE: CPT | Performed by: UROLOGY

## 2022-01-11 ASSESSMENT — MIFFLIN-ST. JEOR: SCORE: 1739.01

## 2022-01-11 ASSESSMENT — PAIN SCALES - GENERAL: PAINLEVEL: NO PAIN (0)

## 2022-01-11 NOTE — LETTER
1/11/2022       RE: Andre Rose  [address] [facility] [address]     Dear Colleague,    Thank you for referring your patient, Andre Rose, to the [facility] at [facility]. Please see a copy of my visit note below.    Office Visit Note  [facility]  [phone]    UROLOGIC DIAGNOSES:   Elevated PSA, enlarged prostate    CURRENT INTERVENTIONS:   MRI prostate 2019  Negative prostate biopsy 2020    HISTORY:   Andre returns to clinic today for PSA follow-up.  Last month he had a PSA that was elevated further at 6.8.  I therefore wanted him to come back today for another PSA check. The PSA today is 6.6.  He continues to have no urinary symptoms or complaints.      PAST MEDICAL HISTORY:   Past Medical History:   Diagnosis Date     Anal fistula      Blood in semen      Epididymitis, bilateral      Former smoker quit 3/10    16 pack year history     Headaches      Hematospermia      Orchitis, epididymitis, and epididymo-orchitis, with abscess      Overweight (BMI 25.0-29.9)      Penile discharge      Premature ejaculation 10/10/2007     Snores        PAST SURGICAL HISTORY:   Past Surgical History:   Procedure Laterality Date     ADENOIDECTOMY       EXAM UNDER ANESTHESIA RECTUM  8/25/2012    Procedure: EXAM UNDER ANESTHESIA RECTUM;  EXAM UNDER ANESTHESIA, NO EVIDENCE OF FISTULA FOUND;  Surgeon: Goldberg, Stanley Morton, MD;  Location: [facility]     FISTULOTOMY RECTUM  12/10/2011    Procedure:FISTULOTOMY RECTUM; Surgeon:GOLDBERG, STANLEY MORTON; Location:[facility]     FISTULOTOMY RECTUM  2/9/2013    Procedure: FISTULOTOMY RECTUM;  FISTULOTOMY;  Surgeon: Goldberg, Stanley Morton, MD;  Location: [facility]     REMV PILONIDAL LESION SIMPLE       ZZHC COLONOSCOPY THRU STOMA, DIAGNOSTIC  2009    repeat age 50       FAMILY HISTORY:   Family History   Problem Relation Age of Onset     Hypertension Mother      Lung Cancer Mother      Prostate  Cancer Maternal Uncle         ~52     Prostate Cancer Paternal Grandfather      Alzheimer Disease Maternal Grandfather      Cancer - colorectal No family hx of      Gastrointestinal Disease No family hx of      Diabetes No family hx of      C.A.D. No family hx of      Asthma No family hx of      Cerebrovascular Disease No family hx of        SOCIAL HISTORY:   Social History     Socioeconomic History     Marital status:      Spouse name: Not on file     Number of children: 2     Years of education: Not on file     Highest education level: Not on file   Occupational History     Employer: [employer]   Tobacco Use     Smoking status: Current Every Day Smoker     Packs/day: 0.50     Years: 20.00     Pack years: 10.00     Types: Cigarettes     Smokeless tobacco: Never Used   Substance and Sexual Activity     Alcohol use: Yes     Drug use: No     Sexual activity: Yes     Partners: Female   Other Topics Concern     Parent/sibling w/ CABG, MI or angioplasty before 65F 55M? Not Asked   Social History Narrative     Not on file     Social Determinants of Health     Financial Resource Strain: Not on file   Food Insecurity: Not on file   Transportation Needs: Not on file   Physical Activity: Not on file   Stress: Not on file   Social Connections: Not on file   Intimate Partner Violence: Not on file   Housing Stability: Not on file       Review Of Systems:  Skin: No rash, pruritis, or skin pigmentation  Eyes: No changes in vision  Ears/Nose/Throat: No changes in hearing, no nosebleeds  Respiratory: No shortness of breath, dyspnea on exertion, cough, or hemoptysis  Cardiovascular: No chest pain or palpitations  Gastrointestinal: No diarrhea or constipation. No abdominal pain. No hematochezia  Genitourinary: see HPI  Musculoskeletal: No pain or swelling of joints, normal range of motion  Neurologic: No weakness or tremors  Psychiatric: No recent changes in memory or mood  Hematologic/Lymphatic/Immunologic: No easy bruising or  enlarged lymph nodes  Endocrine: No weight gain or loss      PHYSICAL EXAM:    There were no vitals taken for this visit.    Constitutional: Well developed. Conversant and in no acute distress  Eyes: Anicteric sclera, conjunctiva clear, normal extraocular movements  ENT: Normocephalic and atraumatic,   Skin: Warm and dry. No rashes or lesions  Cardiac: No peripheral edema  Back/Flank: Not done  CNS/PNS: Normal musculature and movements, moves all extremities normally  Respiratory: Normal non-labored breathing  Abdomen: Soft nontender and nondistended  Peripheral Vascular: No peripheral edema  Mental Status/Psych: Alert and Oriented x 3. Normal mood and affect    Penis: Not done  Scrotal Skin: Not done  Testicles: Not done  Epididymis: Not done  Digital Rectal Exam: Prostate is moderately enlarged, benign and symmetric to palpation.  No evidence of prostatitis.    Cystoscopy: Not done    Imaging: None    Urinalysis: UA RESULTS:  Recent Labs   Lab Test 11/18/19  1535   COLOR Yellow   APPEARANCE Clear   URINEGLC Negative   URINEBILI Negative   URINEKETONE Negative   SG 1.015   UBLD Negative   URINEPH 7.0   PROTEIN Negative   UROBILINOGEN 0.2   NITRITE Negative   LEUKEST Negative     PSA: 6.6    Post Void Residual:     Other labs: None today    IMPRESSION:  Enlarged prostate, elevated PSA    PLAN:  His PSA has risen since his negative prostate biopsy 2 years ago.  I recommended another evaluation for a possible prostate cancer.  I recommended an MRI of the prostate and he agreed to it.  MRI of the prostate was ordered for him and I will contact him when the results are available.    Hamzah Bowen M.D.

## 2022-01-11 NOTE — PROGRESS NOTES
Office Visit Note  [facility]  [phone]    UROLOGIC DIAGNOSES:   Elevated PSA, enlarged prostate    CURRENT INTERVENTIONS:   MRI prostate 2019  Negative prostate biopsy 2020    HISTORY:   Andre returns to clinic today for PSA follow-up.  Last month he had a PSA that was elevated further at 6.8.  I therefore wanted him to come back today for another PSA check. The PSA today is 6.6.  He continues to have no urinary symptoms or complaints.      PAST MEDICAL HISTORY:   Past Medical History:   Diagnosis Date     Anal fistula      Blood in semen      Epididymitis, bilateral      Former smoker quit 3/10    16 pack year history     Headaches      Hematospermia      Orchitis, epididymitis, and epididymo-orchitis, with abscess      Overweight (BMI 25.0-29.9)      Penile discharge      Premature ejaculation 10/10/2007     Snores        PAST SURGICAL HISTORY:   Past Surgical History:   Procedure Laterality Date     ADENOIDECTOMY       EXAM UNDER ANESTHESIA RECTUM  8/25/2012    Procedure: EXAM UNDER ANESTHESIA RECTUM;  EXAM UNDER ANESTHESIA, NO EVIDENCE OF FISTULA FOUND;  Surgeon: Goldberg, Stanley Morton, MD;  Location: [facility]     FISTULOTOMY RECTUM  12/10/2011    Procedure:FISTULOTOMY RECTUM; Surgeon:GOLDBERG, STANLEY MORTON; Location:[facility]     FISTULOTOMY RECTUM  2/9/2013    Procedure: FISTULOTOMY RECTUM;  FISTULOTOMY;  Surgeon: Goldberg, Stanley Morton, MD;  Location: [facility]     REMV PILONIDAL LESION SIMPLE       ZZHC COLONOSCOPY THRU STOMA, DIAGNOSTIC  2009    repeat age 50       FAMILY HISTORY:   Family History   Problem Relation Age of Onset     Hypertension Mother      Lung Cancer Mother      Prostate Cancer Maternal Uncle         ~52     Prostate Cancer Paternal Grandfather      Alzheimer Disease Maternal Grandfather      Cancer - colorectal No family hx of      Gastrointestinal Disease No family hx of      Diabetes No family hx of      C.A.D. No family hx of      Asthma No family hx of       Cerebrovascular Disease No family hx of        SOCIAL HISTORY:   Social History     Socioeconomic History     Marital status:      Spouse name: Not on file     Number of children: 2     Years of education: Not on file     Highest education level: Not on file   Occupational History     Employer: [employer]   Tobacco Use     Smoking status: Current Every Day Smoker     Packs/day: 0.50     Years: 20.00     Pack years: 10.00     Types: Cigarettes     Smokeless tobacco: Never Used   Substance and Sexual Activity     Alcohol use: Yes     Drug use: No     Sexual activity: Yes     Partners: Female   Other Topics Concern     Parent/sibling w/ CABG, MI or angioplasty before 65F 55M? Not Asked   Social History Narrative     Not on file     Social Determinants of Health     Financial Resource Strain: Not on file   Food Insecurity: Not on file   Transportation Needs: Not on file   Physical Activity: Not on file   Stress: Not on file   Social Connections: Not on file   Intimate Partner Violence: Not on file   Housing Stability: Not on file       Review Of Systems:  Skin: No rash, pruritis, or skin pigmentation  Eyes: No changes in vision  Ears/Nose/Throat: No changes in hearing, no nosebleeds  Respiratory: No shortness of breath, dyspnea on exertion, cough, or hemoptysis  Cardiovascular: No chest pain or palpitations  Gastrointestinal: No diarrhea or constipation. No abdominal pain. No hematochezia  Genitourinary: see HPI  Musculoskeletal: No pain or swelling of joints, normal range of motion  Neurologic: No weakness or tremors  Psychiatric: No recent changes in memory or mood  Hematologic/Lymphatic/Immunologic: No easy bruising or enlarged lymph nodes  Endocrine: No weight gain or loss      PHYSICAL EXAM:    There were no vitals taken for this visit.    Constitutional: Well developed. Conversant and in no acute distress  Eyes: Anicteric sclera, conjunctiva clear, normal extraocular movements  ENT: Normocephalic and atraumatic,    Skin: Warm and dry. No rashes or lesions  Cardiac: No peripheral edema  Back/Flank: Not done  CNS/PNS: Normal musculature and movements, moves all extremities normally  Respiratory: Normal non-labored breathing  Abdomen: Soft nontender and nondistended  Peripheral Vascular: No peripheral edema  Mental Status/Psych: Alert and Oriented x 3. Normal mood and affect    Penis: Not done  Scrotal Skin: Not done  Testicles: Not done  Epididymis: Not done  Digital Rectal Exam: Prostate is moderately enlarged, benign and symmetric to palpation.  No evidence of prostatitis.    Cystoscopy: Not done    Imaging: None    Urinalysis: UA RESULTS:  Recent Labs   Lab Test 11/18/19  1535   COLOR Yellow   APPEARANCE Clear   URINEGLC Negative   URINEBILI Negative   URINEKETONE Negative   SG 1.015   UBLD Negative   URINEPH 7.0   PROTEIN Negative   UROBILINOGEN 0.2   NITRITE Negative   LEUKEST Negative       PSA: 6.6    Post Void Residual:     Other labs: None today      IMPRESSION:  Enlarged prostate, elevated PSA    PLAN:  His PSA has risen since his negative prostate biopsy 2 years ago.  I recommended another evaluation for a possible prostate cancer.  I recommended an MRI of the prostate and he agreed to it.  MRI of the prostate was ordered for him and I will contact him when the results are available.      Hamzah Bowen M.D.

## 2022-01-11 NOTE — NURSING NOTE
Chief Complaint   Patient presents with     Elevated PSA     PSA check   Patient states in AM he has problems emptying.  Dahlia Ibarra LPN

## 2022-01-31 ENCOUNTER — TELEPHONE (OUTPATIENT)
Dept: SURGERY | Facility: CLINIC | Age: 47
End: 2022-01-31

## 2022-01-31 ENCOUNTER — ANCILLARY PROCEDURE (OUTPATIENT)
Dept: MRI IMAGING | Facility: CLINIC | Age: 47
End: 2022-01-31
Attending: UROLOGY
Payer: COMMERCIAL

## 2022-01-31 DIAGNOSIS — R97.20 ELEVATED PROSTATE SPECIFIC ANTIGEN (PSA): ICD-10-CM

## 2022-01-31 PROCEDURE — 72197 MRI PELVIS W/O & W/DYE: CPT | Performed by: RADIOLOGY

## 2022-01-31 PROCEDURE — A9585 GADOBUTROL INJECTION: HCPCS | Performed by: RADIOLOGY

## 2022-01-31 RX ORDER — GADOBUTROL 604.72 MG/ML
10 INJECTION INTRAVENOUS ONCE
Status: COMPLETED | OUTPATIENT
Start: 2022-01-31 | End: 2022-01-31

## 2022-01-31 RX ADMIN — GADOBUTROL 10 ML: 604.72 INJECTION INTRAVENOUS at 09:09

## 2022-02-27 ENCOUNTER — HEALTH MAINTENANCE LETTER (OUTPATIENT)
Age: 47
End: 2022-02-27

## 2022-04-12 ENCOUNTER — LAB (OUTPATIENT)
Dept: LAB | Facility: CLINIC | Age: 47
End: 2022-04-12
Payer: COMMERCIAL

## 2022-04-12 DIAGNOSIS — R97.20 ELEVATED PROSTATE SPECIFIC ANTIGEN (PSA): Primary | ICD-10-CM

## 2022-04-12 LAB — PSA SERPL-MCNC: 7.3 UG/L (ref 0–4)

## 2022-04-12 PROCEDURE — 36415 COLL VENOUS BLD VENIPUNCTURE: CPT

## 2022-04-12 PROCEDURE — 84153 ASSAY OF PSA TOTAL: CPT

## 2022-04-19 ENCOUNTER — VIRTUAL VISIT (OUTPATIENT)
Dept: UROLOGY | Facility: CLINIC | Age: 47
End: 2022-04-19
Payer: COMMERCIAL

## 2022-04-19 VITALS — HEIGHT: 68 IN | BODY MASS INDEX: 31.07 KG/M2 | WEIGHT: 205 LBS

## 2022-04-19 DIAGNOSIS — R97.20 ELEVATED PROSTATE SPECIFIC ANTIGEN (PSA): Primary | ICD-10-CM

## 2022-04-19 PROCEDURE — 99214 OFFICE O/P EST MOD 30 MIN: CPT | Mod: 95 | Performed by: UROLOGY

## 2022-04-19 RX ORDER — CIPROFLOXACIN 500 MG/1
500 TABLET, FILM COATED ORAL 2 TIMES DAILY
Qty: 6 TABLET | Refills: 0 | Status: SHIPPED | OUTPATIENT
Start: 2022-04-19 | End: 2022-04-22

## 2022-04-19 ASSESSMENT — PAIN SCALES - GENERAL: PAINLEVEL: NO PAIN (0)

## 2022-04-19 NOTE — LETTER
4/19/2022       RE: Andre Rose  [address] [facility] [address]     Dear Colleague,    Thank you for referring your patient, Andre Rose, to the [facility] at [facility]. Please see a copy of my visit note below.    *PT WILL MEET YOU IN MYCHART*    Andre is a 46 year old who is being evaluated via a billable video visit.      How would you like to obtain your AVS? MyChart  If the video visit is dropped, the invitation should be resent by: Text to cell phone: [phone]  Will anyone else be joining your video visit? No         Office Visit Note  [facility]  [phone]    UROLOGIC DIAGNOSES:   Elevated PSA    CURRENT INTERVENTIONS:   Negative prostate biopsy 2020  MRI prostate x2    HISTORY:   Andre had a repeat MRI of the prostate performed in January and it showed an enlarged prostate measuring 67 g with no areas concerning for prostate cancer.  The PSA was then rechecked last week and it is elevated further at 7.3.  He continues to have no urinary symptoms or complaints.      PAST MEDICAL HISTORY:   Past Medical History:   Diagnosis Date     Anal fistula      Blood in semen      Epididymitis, bilateral      Former smoker quit 3/10    16 pack year history     Headaches      Hematospermia      Orchitis, epididymitis, and epididymo-orchitis, with abscess      Overweight (BMI 25.0-29.9)      Penile discharge      Premature ejaculation 10/10/2007     Snores        PAST SURGICAL HISTORY:   Past Surgical History:   Procedure Laterality Date     ADENOIDECTOMY       EXAM UNDER ANESTHESIA RECTUM  8/25/2012    Procedure: EXAM UNDER ANESTHESIA RECTUM;  EXAM UNDER ANESTHESIA, NO EVIDENCE OF FISTULA FOUND;  Surgeon: Goldberg, Stanley Morton, MD;  Location: [facility]     FISTULOTOMY RECTUM  12/10/2011    Procedure:FISTULOTOMY RECTUM; Surgeon:GOLDBERG, STANLEY MORTON; Location:[facility]     FISTULOTOMY RECTUM  2/9/2013     Procedure: FISTULOTOMY RECTUM;  FISTULOTOMY;  Surgeon: Goldberg, Stanley Morton, MD;  Location: [facility]     REMV PILONIDAL LESION SIMPLE       ZZHC COLONOSCOPY THRU STOMA, DIAGNOSTIC  2009    repeat age 50       FAMILY HISTORY:   Family History   Problem Relation Age of Onset     Hypertension Mother      Lung Cancer Mother      Prostate Cancer Maternal Uncle         ~52     Prostate Cancer Paternal Grandfather      Alzheimer Disease Maternal Grandfather      Cancer - colorectal No family hx of      Gastrointestinal Disease No family hx of      Diabetes No family hx of      C.A.D. No family hx of      Asthma No family hx of      Cerebrovascular Disease No family hx of        SOCIAL HISTORY:   Social History     Tobacco Use     Smoking status: Current Every Day Smoker     Packs/day: 0.50     Years: 20.00     Pack years: 10.00     Types: Cigarettes     Smokeless tobacco: Never Used   Substance Use Topics     Alcohol use: Yes       REVIEW OF SYSTEMS:  Skin: No rash, pruritis, or skin pigmentation  Eyes: No changes in vision  Ears/Nose/Throat: No changes in hearing, no nosebleeds  Respiratory: No shortness of breath, dyspnea on exertion, cough, or hemoptysis  Cardiovascular: No chest pain or palpitations  Gastrointestinal: No diarrhea or constipation. No abdominal pain. No hematochezia  Genitourinary: see HPI  Musculoskeletal: No pain or swelling of joints, normal range of motion  Neurologic: No weakness or tremors  Psychiatric: No recent changes in memory or mood  Hematologic/Lymphatic/Immunologic: No easy bruising or enlarged lymph nodes  Endocrine: No weight gain or loss      PHYSICAL EXAM:    General: Alert and oriented to time, place, and self. In NAD   HEENT: Head AT/NC, EOMI, CN Grossly intact   Lungs: no respiratory distress, or pursed lip breathing   Heart: No obvious jugular venous distension present   Musculoskeltal: Normal movements. Normal appearing musculature  Skin: no suspicious lesions or rashes   Neuro:  Alert, oriented, speech and mentation normal; moving all 4 extremities equally.   Psych: affect and mood normal    Imaging: None    Urinalysis: UA RESULTS:  Recent Labs   Lab Test 01/11/22  0841   COLOR Yellow   APPEARANCE Clear   URINEGLC Negative   URINEBILI Negative   URINEKETONE Negative   SG 1.025   UBLD Negative   URINEPH 7.0   PROTEIN Negative   UROBILINOGEN 0.2   NITRITE Negative   LEUKEST Negative       PSA: 7.3    Post Void Residual:     Other labs: None today      IMPRESSION:  Rising PSA    PLAN:  His PSA continues to rise.  He has had 2 MRIs of the prostate that are negative but we did discuss the risks of a false negative with the MRI.  We discussed his options of following the PSA closely versus proceeding with another template prostate biopsy.  Given the rising PSA and his very young age and good health I recommended that we repeat his template prostate biopsy.  We discussed biopsy in detail today along with the increased risk of infection with a repeat biopsy.  I would recommend he take both ciprofloxacin and gentamicin injection prior to biopsy.  All of his questions were answered and he wishes to proceed.  We will get him scheduled for prostate biopsy in the near future.      Hamzah Bowen M.D.              Video Start Time: 11:38 AM  Video-Visit Details    Type of service:  Video Visit    Video End Time:11:50 AM    Originating Location (pt. Location): Home    Distant Location (provider location):  [facility] [place]     Platform used for Video Visit: [employer]

## 2022-04-19 NOTE — PROGRESS NOTES
*PT WILL MEET YOU IN MYCHART*    Andre is a 46 year old who is being evaluated via a billable video visit.      How would you like to obtain your AVS? [employer]  If the video visit is dropped, the invitation should be resent by: Text to cell phone: [phone]  Will anyone else be joining your video visit? No         Office Visit Note  [facility]  [phone]    UROLOGIC DIAGNOSES:   Elevated PSA    CURRENT INTERVENTIONS:   Negative prostate biopsy 2020  MRI prostate x2    HISTORY:   Andre had a repeat MRI of the prostate performed in January and it showed an enlarged prostate measuring 67 g with no areas concerning for prostate cancer.  The PSA was then rechecked last week and it is elevated further at 7.3.  He continues to have no urinary symptoms or complaints.      PAST MEDICAL HISTORY:   Past Medical History:   Diagnosis Date     Anal fistula      Blood in semen      Epididymitis, bilateral      Former smoker quit 3/10    16 pack year history     Headaches      Hematospermia      Orchitis, epididymitis, and epididymo-orchitis, with abscess      Overweight (BMI 25.0-29.9)      Penile discharge      Premature ejaculation 10/10/2007     Snores        PAST SURGICAL HISTORY:   Past Surgical History:   Procedure Laterality Date     ADENOIDECTOMY       EXAM UNDER ANESTHESIA RECTUM  8/25/2012    Procedure: EXAM UNDER ANESTHESIA RECTUM;  EXAM UNDER ANESTHESIA, NO EVIDENCE OF FISTULA FOUND;  Surgeon: Goldberg, Stanley Morton, MD;  [facility]     FISTULOTOMY RECTUM  12/10/2011    Procedure:FISTULOTOMY RECTUM; Surgeon:GOLDBERG, STANLEY MORTON; Location: [facility]     FISTULOTOMY RECTUM  2/9/2013    Procedure: FISTULOTOMY RECTUM;  FISTULOTOMY;  Surgeon: Goldberg, Stanley Morton, MD;  Location: [facility]     REMV PILONIDAL LESION SIMPLE       ZZHC COLONOSCOPY THRU STOMA, DIAGNOSTIC  2009    repeat age 50       FAMILY HISTORY:   Family History   Problem Relation Age of Onset     Hypertension Mother      Lung Cancer  Mother      Prostate Cancer Maternal Uncle         ~52     Prostate Cancer Paternal Grandfather      Alzheimer Disease Maternal Grandfather      Cancer - colorectal No family hx of      Gastrointestinal Disease No family hx of      Diabetes No family hx of      C.A.D. No family hx of      Asthma No family hx of      Cerebrovascular Disease No family hx of        SOCIAL HISTORY:   Social History     Tobacco Use     Smoking status: Current Every Day Smoker     Packs/day: 0.50     Years: 20.00     Pack years: 10.00     Types: Cigarettes     Smokeless tobacco: Never Used   Substance Use Topics     Alcohol use: Yes       REVIEW OF SYSTEMS:  Skin: No rash, pruritis, or skin pigmentation  Eyes: No changes in vision  Ears/Nose/Throat: No changes in hearing, no nosebleeds  Respiratory: No shortness of breath, dyspnea on exertion, cough, or hemoptysis  Cardiovascular: No chest pain or palpitations  Gastrointestinal: No diarrhea or constipation. No abdominal pain. No hematochezia  Genitourinary: see HPI  Musculoskeletal: No pain or swelling of joints, normal range of motion  Neurologic: No weakness or tremors  Psychiatric: No recent changes in memory or mood  Hematologic/Lymphatic/Immunologic: No easy bruising or enlarged lymph nodes  Endocrine: No weight gain or loss      PHYSICAL EXAM:    General: Alert and oriented to time, place, and self. In NAD   HEENT: Head AT/NC, EOMI, CN Grossly intact   Lungs: no respiratory distress, or pursed lip breathing   Heart: No obvious jugular venous distension present   Musculoskeltal: Normal movements. Normal appearing musculature  Skin: no suspicious lesions or rashes   Neuro: Alert, oriented, speech and mentation normal; moving all 4 extremities equally.   Psych: affect and mood normal    Imaging: None    Urinalysis: UA RESULTS:  Recent Labs   Lab Test 01/11/22  0841   COLOR Yellow   APPEARANCE Clear   URINEGLC Negative   URINEBILI Negative   URINEKETONE Negative   SG 1.025   UBLD  Negative   URINEPH 7.0   PROTEIN Negative   UROBILINOGEN 0.2   NITRITE Negative   LEUKEST Negative       PSA: 7.3    Post Void Residual:     Other labs: None today      IMPRESSION:  Rising PSA    PLAN:  His PSA continues to rise.  He has had 2 MRIs of the prostate that are negative but we did discuss the risks of a false negative with the MRI.  We discussed his options of following the PSA closely versus proceeding with another template prostate biopsy.  Given the rising PSA and his very young age and good health I recommended that we repeat his template prostate biopsy.  We discussed biopsy in detail today along with the increased risk of infection with a repeat biopsy.  I would recommend he take both ciprofloxacin and gentamicin injection prior to biopsy.  All of his questions were answered and he wishes to proceed.  We will get him scheduled for prostate biopsy in the near future.      Hamzah Bowen M.D.              Video Start Time: 11:38 AM  Video-Visit Details    Type of service:  Video Visit    Video End Time:11:50 AM    Originating Location (pt. Location): Home    Distant Location (provider location):  [facility] [place]     Platform used for Video Visit: [employer]

## 2022-04-21 ENCOUNTER — TELEPHONE (OUTPATIENT)
Dept: UROLOGY | Facility: CLINIC | Age: 47
End: 2022-04-21
Payer: COMMERCIAL

## 2022-04-21 NOTE — TELEPHONE ENCOUNTER
----- Message from Dede Campos sent at 4/19/2022 12:35 PM CDT -----  Return for regular prostate biopsy (not uronav). Needs gent prior.    [facility]

## 2022-04-25 DIAGNOSIS — Z79.2 PROPHYLACTIC ANTIBIOTIC: Primary | ICD-10-CM

## 2022-04-25 RX ORDER — CIPROFLOXACIN 500 MG/1
500 TABLET, FILM COATED ORAL 2 TIMES DAILY
Qty: 6 TABLET | Refills: 0 | Status: SHIPPED | OUTPATIENT
Start: 2022-04-25 | End: 2023-05-02

## 2022-05-24 ENCOUNTER — OFFICE VISIT (OUTPATIENT)
Dept: UROLOGY | Facility: CLINIC | Age: 47
End: 2022-05-24
Payer: COMMERCIAL

## 2022-05-24 ENCOUNTER — ALLIED HEALTH/NURSE VISIT (OUTPATIENT)
Dept: UROLOGY | Facility: CLINIC | Age: 47
End: 2022-05-24

## 2022-05-24 VITALS
HEIGHT: 68 IN | WEIGHT: 195 LBS | BODY MASS INDEX: 29.55 KG/M2 | SYSTOLIC BLOOD PRESSURE: 154 MMHG | DIASTOLIC BLOOD PRESSURE: 97 MMHG | HEART RATE: 55 BPM

## 2022-05-24 DIAGNOSIS — R97.20 ELEVATED PROSTATE SPECIFIC ANTIGEN (PSA): Primary | ICD-10-CM

## 2022-05-24 DIAGNOSIS — N40.0 ENLARGED PROSTATE: ICD-10-CM

## 2022-05-24 PROCEDURE — 76942 ECHO GUIDE FOR BIOPSY: CPT | Performed by: UROLOGY

## 2022-05-24 PROCEDURE — 55700 PR BIOPSY OF PROSTATE,NEEDLE/PUNCH: CPT | Performed by: UROLOGY

## 2022-05-24 PROCEDURE — 88305 TISSUE EXAM BY PATHOLOGIST: CPT | Performed by: PATHOLOGY

## 2022-05-24 PROCEDURE — 96372 THER/PROPH/DIAG INJ SC/IM: CPT | Mod: 59 | Performed by: UROLOGY

## 2022-05-24 RX ORDER — LIDOCAINE HYDROCHLORIDE 10 MG/ML
20 INJECTION, SOLUTION INFILTRATION; PERINEURAL ONCE
Status: COMPLETED | OUTPATIENT
Start: 2022-05-24 | End: 2022-05-24

## 2022-05-24 RX ORDER — GENTAMICIN 40 MG/ML
80 INJECTION, SOLUTION INTRAMUSCULAR; INTRAVENOUS ONCE
Status: COMPLETED | OUTPATIENT
Start: 2022-05-24 | End: 2022-05-24

## 2022-05-24 RX ADMIN — LIDOCAINE HYDROCHLORIDE 20 ML: 10 INJECTION, SOLUTION INFILTRATION; PERINEURAL at 09:35

## 2022-05-24 RX ADMIN — GENTAMICIN 80 MG: 40 INJECTION, SOLUTION INTRAMUSCULAR; INTRAVENOUS at 09:15

## 2022-05-24 ASSESSMENT — PAIN SCALES - GENERAL
PAINLEVEL: NO PAIN (0)
PAINLEVEL: NO PAIN (0)

## 2022-05-24 NOTE — PATIENT INSTRUCTIONS
[facility]  Transrectal Ultrasound  Post Operative Information    The physician who performed your Transrectal Ultrasound is Dr. Bowen (telephone number [phone]).  Please contact this doctor if you have any problems or questions.  If unable to reach your doctor, please return to the Emergency Department.    Take one antibiotic the evening of the procedure and then as directed on your prescription.  Drink at least 6-8 glasses of fluids for the first 48 hours.  Avoid heavy lifting and strenuous activity for 48 hours.  Avoid sexual intercourse for the first 24 hours.  No aspirin or ibuprofen products (Motrin, Advil, Nuprin, ect.) for one week.  You may take acetaminophen (Tylenol) for pain.  You may notice a small amount of blood on the tissue after a bowel movement.  You may pass blood with clots in your urine following the procedure.  The amount will decrease with time but may be visible for up to two weeks.   You make have blood in your semen for 4 weeks after the procedure.  You may experience mild perineal (groin area) discomfort after the procedure.  Please call you doctor if you have any of the follow symptoms:  Fever  Increase in the amount of blood passed  Severe discomfort or pain

## 2022-05-24 NOTE — PROGRESS NOTES
Andre Rose is here for a transrectal altrasound guided needle biopsy of the prostate for a significant risk of potentially lethal prostate cancer.    The risks, benefits, of the procudure were discussed.  All questions were answered.  A written informed consent was obtained.      PSA   Date Value Ref Range Status   10/25/2019 4.43 (H) 0 - 4 ug/L Final     Comment:     Assay Method:  Chemiluminescence using Siemens Vista analyzer   03/16/2018 3.97 0 - 4 ug/L Final     Comment:     Assay Method:  Chemiluminescence using Siemens Vista analyzer     PSA Tumor Marker   Date Value Ref Range Status   04/12/2022 7.30 (H) 0.00 - 4.00 ug/L Final   01/11/2022 6.60 (H) 0.00 - 4.00 ug/L Final   12/02/2021 6.80 (H) 0.00 - 4.00 ug/L Final       An enema was completed and 500 mg of Cipro twice daily was started prior to the biopsy.  After obtaining informed consent patient was placed in lateral decubitus position.  The ultrasound probe was placed in the rectum.  The prostate was numbed using ultrasound guidance with 1% lidocaine 5 mls along each nerve bundle.        US images were used to guide the biopsies of the prostate.  12 cores were taken with 6 on each side, 2 at the base,  2 at the midgland and  2 at the apex.  The patient tolerated the procedure well.      We will follow up with the results in 7-10 days and contact patient with these results.

## 2022-05-24 NOTE — NURSING NOTE
he following medication was given:     MEDICATION:  Lidocaine 1% Soln  ROUTE: Local Infiltration   SITE: Prostate  DOSE: 200mg/20ml   LOT #:[id number]   : [employer]  EXPIRATION DATE: 11/2023  NDC#: 55150-251-10  Was there drug waste? Yes  Amount of drug waste (mL): 0.  Reason for waste:  As per MD  Multi-dose vial: Yes    Jacquelyn Isabel LPN

## 2022-05-24 NOTE — LETTER
5/24/2022       RE: Andre Rose  [address] [facility] [address]     Dear Colleague,    Thank you for referring your patient, Andre Rose, to the [facility] [place] at [facility]. Please see a copy of my visit note below.    Andre Rose is here for a transrectal altrasound guided needle biopsy of the prostate for a significant risk of potentially lethal prostate cancer.    The risks, benefits, of the procudure were discussed.  All questions were answered.  A written informed consent was obtained.    PSA   Date Value Ref Range Status   10/25/2019 4.43 (H) 0 - 4 ug/L Final     Comment:     Assay Method:  Chemiluminescence using Siemens Vista analyzer   03/16/2018 3.97 0 - 4 ug/L Final     Comment:     Assay Method:  Chemiluminescence using Siemens Vista analyzer     PSA Tumor Marker   Date Value Ref Range Status   04/12/2022 7.30 (H) 0.00 - 4.00 ug/L Final   01/11/2022 6.60 (H) 0.00 - 4.00 ug/L Final   12/02/2021 6.80 (H) 0.00 - 4.00 ug/L Final       An enema was completed and 500 mg of Cipro twice daily was started prior to the biopsy.  After obtaining informed consent patient was placed in lateral decubitus position.  The ultrasound probe was placed in the rectum.  The prostate was numbed using ultrasound guidance with 1% lidocaine 5 mls along each nerve bundle.        US images were used to guide the biopsies of the prostate.  12 cores were taken with 6 on each side, 2 at the base,  2 at the midgland and  2 at the apex.  The patient tolerated the procedure well.      We will follow up with the results in 7-10 days and contact patient with these results.      Sincerely,    Hamzah Bowen MD

## 2022-05-24 NOTE — NURSING NOTE
The following medication was given:     MEDICATION: Gentamicin   ROUTE: IM  SITE: LUQ - Gluteus  DOSE:80mg/2ml  LOT #: [id number]  : [employer]  EXPIRATION DATE: 03/23  NDC#: 78985-97-18   Was there drug waste? No  Multi-dose vial: Yes  The following medication was given:     Using a 1 1/2 inch 21 guage needle medication drawn up as ordered with sterile syringe. Using sterile technique, a new 1 1/2 needle 21 gauge placed on syringe and patient cleansed with alcohol pad. Site was mapped out using palm of hand on the greater trochanter and forefinger on iliac crest. Using V technique between middle finger and index finger. Skin was tracted and Injection was given using a 90 degree angle dart method and after aspiration of needle and no blood, medication was slowly given via IM injection. After 10 seconds needle was removed.  Patient tolerated injection well, and bandage placed on site following insertion removal.     Jacquelyn Isabel LPN

## 2022-05-24 NOTE — NURSING NOTE
Chief Complaint   Patient presents with     Elevated PSA     Patient is here for Transrectal Ultrasound Guided Prostate Biopsies      Procedure was explained to patient prior to performing said procedure.  The patient signed the [place] consent form and all questions were answered prior to the procedure.  Any pre-procedural antibiotics were given according to the performing physician's recommendation.  Patient reviewed information on the labels attached to samples and confirmed the accuracy of all of the labels.     Performing Physician:  Dr. Bowen  Antibiotic taken?  yes  Aspirin or other blood thinning medications discontinued 7-10 days:    Time of enema:  8:00am  Patient given Gentamicin intramuscular injection 30 minutes prior to procedure  Yes    Twelve samples were taken from the right and left, medial and lateral base, mid and apex of the prostate respectively.  No additional samples were taken from Prostate.  Vitals were repeated prior to patient leaving and instructions for post TRUS care were explained to the patient.

## 2022-05-25 LAB
PATH REPORT.COMMENTS IMP SPEC: NORMAL
PATH REPORT.COMMENTS IMP SPEC: NORMAL
PATH REPORT.FINAL DX SPEC: NORMAL
PATH REPORT.GROSS SPEC: NORMAL
PATH REPORT.MICROSCOPIC SPEC OTHER STN: NORMAL
PATH REPORT.RELEVANT HX SPEC: NORMAL
PHOTO IMAGE: NORMAL

## 2022-05-31 ENCOUNTER — VIRTUAL VISIT (OUTPATIENT)
Dept: UROLOGY | Facility: CLINIC | Age: 47
End: 2022-05-31
Payer: COMMERCIAL

## 2022-05-31 VITALS — BODY MASS INDEX: 30.31 KG/M2 | WEIGHT: 200 LBS | HEIGHT: 68 IN

## 2022-05-31 DIAGNOSIS — N40.0 ENLARGED PROSTATE: Primary | ICD-10-CM

## 2022-05-31 PROCEDURE — 99214 OFFICE O/P EST MOD 30 MIN: CPT | Mod: 95 | Performed by: UROLOGY

## 2022-05-31 RX ORDER — TAMSULOSIN HYDROCHLORIDE 0.4 MG/1
0.4 CAPSULE ORAL DAILY
Qty: 30 CAPSULE | Refills: 11 | Status: SHIPPED | OUTPATIENT
Start: 2022-05-31 | End: 2023-05-02

## 2022-05-31 ASSESSMENT — PAIN SCALES - GENERAL: PAINLEVEL: NO PAIN (0)

## 2022-05-31 NOTE — LETTER
5/31/2022       RE: nAdre Rose  [address] [facility] [address]     Dear Colleague,    Thank you for referring your patient, Andre Rose, to the [facility] [place] at [facility]. Please see a copy of my visit note below.    PT WILL MEET YOU IN Instant AVHART    Andre is a 46 year old who is being evaluated via a billable video visit.      How would you like to obtain your AVS? [facility]  If the video visit is dropped, the invitation should be resent by: Text to cell phone: [phone]  Will anyone else be joining your video visit? No         Office Visit Note  [facility]  [phone]    UROLOGIC DIAGNOSES:   Enlarged prostate  Elevated PSA    CURRENT INTERVENTIONS:   MRI prostate x2  Negative prostate biopsy x2    HISTORY:   Andre underwent repeat template prostate biopsy and it showed no evidence of cancer.  He felt well after the biopsy.  He has an enlarged prostate and he is bothered by urinary complaints.  He says mainly it is difficult to start his urinary stream in the morning.  Sometimes he will have strong urgency as well.      PAST MEDICAL HISTORY:   Past Medical History:   Diagnosis Date     Anal fistula      Blood in semen      Epididymitis, bilateral      Former smoker quit 3/10    16 pack year history     Headaches      Hematospermia      Orchitis, epididymitis, and epididymo-orchitis, with abscess      Overweight (BMI 25.0-29.9)      Penile discharge      Premature ejaculation 10/10/2007     Snores        PAST SURGICAL HISTORY:   Past Surgical History:   Procedure Laterality Date     ADENOIDECTOMY       EXAM UNDER ANESTHESIA RECTUM  8/25/2012    Procedure: EXAM UNDER ANESTHESIA RECTUM;  EXAM UNDER ANESTHESIA, NO EVIDENCE OF FISTULA FOUND;  Surgeon: Goldberg, Stanley Morton, MD;  Location: [facility]     FISTULOTOMY RECTUM  12/10/2011    Procedure:FISTULOTOMY RECTUM; Surgeon:GOLDBERG, STANLEY MORTON; Location:[facility]      FISTULOTOMY RECTUM  2/9/2013    Procedure: FISTULOTOMY RECTUM;  FISTULOTOMY;  Surgeon: Goldberg, Stanley Morton, MD;  Location: [facility]     REMV PILONIDAL LESION SIMPLE       ZZHC COLONOSCOPY THRU STOMA, DIAGNOSTIC  2009    repeat age 50       FAMILY HISTORY:   Family History   Problem Relation Age of Onset     Hypertension Mother      Lung Cancer Mother      Prostate Cancer Maternal Uncle         ~52     Prostate Cancer Paternal Grandfather      Alzheimer Disease Maternal Grandfather      Cancer - colorectal No family hx of      Gastrointestinal Disease No family hx of      Diabetes No family hx of      C.A.D. No family hx of      Asthma No family hx of      Cerebrovascular Disease No family hx of        SOCIAL HISTORY:   Social History     Tobacco Use     Smoking status: Current Every Day Smoker     Packs/day: 0.50     Years: 20.00     Pack years: 10.00     Types: Cigarettes     Smokeless tobacco: Never Used   Substance Use Topics     Alcohol use: Yes       REVIEW OF SYSTEMS:  Skin: No rash, pruritis, or skin pigmentation  Eyes: No changes in vision  Ears/Nose/Throat: No changes in hearing, no nosebleeds  Respiratory: No shortness of breath, dyspnea on exertion, cough, or hemoptysis  Cardiovascular: No chest pain or palpitations  Gastrointestinal: No diarrhea or constipation. No abdominal pain. No hematochezia  Genitourinary: see HPI  Musculoskeletal: No pain or swelling of joints, normal range of motion  Neurologic: No weakness or tremors  Psychiatric: No recent changes in memory or mood  Hematologic/Lymphatic/Immunologic: No easy bruising or enlarged lymph nodes  Endocrine: No weight gain or loss      PHYSICAL EXAM:    General: Alert and oriented to time, place, and self. In NAD   HEENT: Head AT/NC, EOMI, CN Grossly intact   Lungs: no respiratory distress, or pursed lip breathing   Heart: No obvious jugular venous distension present   Musculoskeltal: Normal movements. Normal appearing musculature  Skin: no  suspicious lesions or rashes   Neuro: Alert, oriented, speech and mentation normal; moving all 4 extremities equally.   Psych: affect and mood normal    Imaging: None    Urinalysis: UA RESULTS:  Recent Labs   Lab Test 01/11/22  0841   COLOR Yellow   APPEARANCE Clear   URINEGLC Negative   URINEBILI Negative   URINEKETONE Negative   SG 1.025   UBLD Negative   URINEPH 7.0   PROTEIN Negative   UROBILINOGEN 0.2   NITRITE Negative   LEUKEST Negative       PSA: 7.3    Post Void Residual:     Other labs: None today      IMPRESSION:  Enlarged prostate  Elevated PSA    PLAN:  We discussed his prostate biopsy and all biopsies were negative.  This was his second negative prostate biopsy.    We discussed his urinary symptoms.  Urinary symptoms are likely from the enlarged prostate.  We discussed medical and surgical treatment options for the enlarged prostate.  He would like to try medicine.  I prescribed him Flomax and gave him instructions.    I will see him back in 1 month to see how he is doing on the Flomax.  If there is no improvement on the medication we might proceed with a cystoscopy at his next visit.      Hamzah Bwoen M.D.              Video Start Time: 11:08 AM  Video-Visit Details    Type of service:  Video Visit    Video End Time:11:21 AM    Originating Location (pt. Location): Home    Distant Location (provider location):  [facility] [place]     Platform used for Video Visit: [employer]

## 2022-05-31 NOTE — PROGRESS NOTES
PT WILL MEET YOU IN The ZebraHART    Andre is a 46 year old who is being evaluated via a billable video visit.      How would you like to obtain your AVS? [employer]  If the video visit is dropped, the invitation should be resent by: Text to cell phone: [phone]  Will anyone else be joining your video visit? No         Office Visit Note  [facility]  [phone]    UROLOGIC DIAGNOSES:   Enlarged prostate  Elevated PSA    CURRENT INTERVENTIONS:   MRI prostate x2  Negative prostate biopsy x2    HISTORY:   Andre underwent repeat template prostate biopsy and it showed no evidence of cancer.  He felt well after the biopsy.  He has an enlarged prostate and he is bothered by urinary complaints.  He says mainly it is difficult to start his urinary stream in the morning.  Sometimes he will have strong urgency as well.      PAST MEDICAL HISTORY:   Past Medical History:   Diagnosis Date     Anal fistula      Blood in semen      Epididymitis, bilateral      Former smoker quit 3/10    16 pack year history     Headaches      Hematospermia      Orchitis, epididymitis, and epididymo-orchitis, with abscess      Overweight (BMI 25.0-29.9)      Penile discharge      Premature ejaculation 10/10/2007     Snores        PAST SURGICAL HISTORY:   Past Surgical History:   Procedure Laterality Date     ADENOIDECTOMY       EXAM UNDER ANESTHESIA RECTUM  8/25/2012    Procedure: EXAM UNDER ANESTHESIA RECTUM;  EXAM UNDER ANESTHESIA, NO EVIDENCE OF FISTULA FOUND;  Surgeon: Goldberg, Stanley Morton, MD;  [facility]     FISTULOTOMY RECTUM  12/10/2011    Procedure:FISTULOTOMY RECTUM; Surgeon:GOLDBERG, STANLEY MORTON; Location: [facility]     FISTULOTOMY RECTUM  2/9/2013    Procedure: FISTULOTOMY RECTUM;  FISTULOTOMY;  Surgeon: Goldberg, Stanley Morton, MD;  [facility]     REMV PILONIDAL LESION SIMPLE       ZZHC COLONOSCOPY THRU STOMA, DIAGNOSTIC  2009    repeat age 50       FAMILY HISTORY:   Family History   Problem Relation Age of Onset      Hypertension Mother      Lung Cancer Mother      Prostate Cancer Maternal Uncle         ~52     Prostate Cancer Paternal Grandfather      Alzheimer Disease Maternal Grandfather      Cancer - colorectal No family hx of      Gastrointestinal Disease No family hx of      Diabetes No family hx of      C.A.D. No family hx of      Asthma No family hx of      Cerebrovascular Disease No family hx of        SOCIAL HISTORY:   Social History     Tobacco Use     Smoking status: Current Every Day Smoker     Packs/day: 0.50     Years: 20.00     Pack years: 10.00     Types: Cigarettes     Smokeless tobacco: Never Used   Substance Use Topics     Alcohol use: Yes       REVIEW OF SYSTEMS:  Skin: No rash, pruritis, or skin pigmentation  Eyes: No changes in vision  Ears/Nose/Throat: No changes in hearing, no nosebleeds  Respiratory: No shortness of breath, dyspnea on exertion, cough, or hemoptysis  Cardiovascular: No chest pain or palpitations  Gastrointestinal: No diarrhea or constipation. No abdominal pain. No hematochezia  Genitourinary: see HPI  Musculoskeletal: No pain or swelling of joints, normal range of motion  Neurologic: No weakness or tremors  Psychiatric: No recent changes in memory or mood  Hematologic/Lymphatic/Immunologic: No easy bruising or enlarged lymph nodes  Endocrine: No weight gain or loss      PHYSICAL EXAM:    General: Alert and oriented to time, place, and self. In NAD   HEENT: Head AT/NC, EOMI, CN Grossly intact   Lungs: no respiratory distress, or pursed lip breathing   Heart: No obvious jugular venous distension present   Musculoskeltal: Normal movements. Normal appearing musculature  Skin: no suspicious lesions or rashes   Neuro: Alert, oriented, speech and mentation normal; moving all 4 extremities equally.   Psych: affect and mood normal    Imaging: None    Urinalysis: UA RESULTS:  Recent Labs   Lab Test 01/11/22  0841   COLOR Yellow   APPEARANCE Clear   URINEGLC Negative   URINEBILI Negative    URINEKETONE Negative   SG 1.025   UBLD Negative   URINEPH 7.0   PROTEIN Negative   UROBILINOGEN 0.2   NITRITE Negative   LEUKEST Negative       PSA: 7.3    Post Void Residual:     Other labs: None today      IMPRESSION:  Enlarged prostate  Elevated PSA    PLAN:  We discussed his prostate biopsy and all biopsies were negative.  This was his second negative prostate biopsy.    We discussed his urinary symptoms.  Urinary symptoms are likely from the enlarged prostate.  We discussed medical and surgical treatment options for the enlarged prostate.  He would like to try medicine.  I prescribed him Flomax and gave him instructions.    I will see him back in 1 month to see how he is doing on the Flomax.  If there is no improvement on the medication we might proceed with a cystoscopy at his next visit.      Hamzah Bowen M.D.              Video Start Time: 11:08 AM  Video-Visit Details    Type of service:  Video Visit    Video End Time:11:21 AM    Originating Location (pt. Location): Home    Distant Location (provider location):  [facility] [place]     Platform used for Video Visit: [employer]     Statement Selected

## 2022-06-02 ENCOUNTER — TELEPHONE (OUTPATIENT)
Dept: UROLOGY | Facility: CLINIC | Age: 47
End: 2022-06-02
Payer: COMMERCIAL

## 2022-06-02 NOTE — TELEPHONE ENCOUNTER
----- Message from Dede Campos sent at 6/1/2022  9:57 AM CDT -----  Return in about 1 month (around 6/30/2022) for see me for discussion and possible cysto.    SDB

## 2022-06-09 ENCOUNTER — TELEPHONE (OUTPATIENT)
Dept: UROLOGY | Facility: CLINIC | Age: 47
End: 2022-06-09
Payer: COMMERCIAL

## 2023-04-09 ENCOUNTER — HEALTH MAINTENANCE LETTER (OUTPATIENT)
Age: 48
End: 2023-04-09

## 2023-05-01 ASSESSMENT — ENCOUNTER SYMPTOMS
PARESTHESIAS: 0
COUGH: 0
EYE PAIN: 0
DIARRHEA: 0
FREQUENCY: 1
WEAKNESS: 0
HEMATOCHEZIA: 0
CONSTIPATION: 0
ABDOMINAL PAIN: 0
NERVOUS/ANXIOUS: 0
HEMATURIA: 0
MYALGIAS: 0
JOINT SWELLING: 0
SHORTNESS OF BREATH: 0
NAUSEA: 0
PALPITATIONS: 0
FEVER: 0
CHILLS: 0
DYSURIA: 1
DIZZINESS: 0
SORE THROAT: 0
HEARTBURN: 0
HEADACHES: 0
ARTHRALGIAS: 0

## 2023-05-02 ENCOUNTER — OFFICE VISIT (OUTPATIENT)
Dept: FAMILY MEDICINE | Facility: CLINIC | Age: 48
End: 2023-05-02
Payer: COMMERCIAL

## 2023-05-02 VITALS
BODY MASS INDEX: 28.34 KG/M2 | WEIGHT: 187 LBS | SYSTOLIC BLOOD PRESSURE: 130 MMHG | RESPIRATION RATE: 18 BRPM | TEMPERATURE: 97.4 F | DIASTOLIC BLOOD PRESSURE: 70 MMHG | HEART RATE: 77 BPM | HEIGHT: 68 IN | OXYGEN SATURATION: 98 %

## 2023-05-02 DIAGNOSIS — R97.20 ELEVATED PROSTATE SPECIFIC ANTIGEN (PSA): ICD-10-CM

## 2023-05-02 DIAGNOSIS — Z12.11 SCREEN FOR COLON CANCER: ICD-10-CM

## 2023-05-02 DIAGNOSIS — Z12.5 SCREENING FOR PROSTATE CANCER: ICD-10-CM

## 2023-05-02 DIAGNOSIS — Z13.6 CARDIOVASCULAR SCREENING; LDL GOAL LESS THAN 160: Primary | ICD-10-CM

## 2023-05-02 DIAGNOSIS — H90.71 MIXED CONDUCTIVE AND SENSORINEURAL HEARING LOSS OF RIGHT EAR, UNSPECIFIED HEARING STATUS ON CONTRALATERAL SIDE: ICD-10-CM

## 2023-05-02 DIAGNOSIS — Z11.59 NEED FOR HEPATITIS C SCREENING TEST: ICD-10-CM

## 2023-05-02 DIAGNOSIS — Z00.00 ENCOUNTER FOR ANNUAL PHYSICAL EXAM: ICD-10-CM

## 2023-05-02 PROCEDURE — 99396 PREV VISIT EST AGE 40-64: CPT | Performed by: FAMILY MEDICINE

## 2023-05-02 ASSESSMENT — ENCOUNTER SYMPTOMS
COUGH: 0
WEAKNESS: 0
HEARTBURN: 0
SHORTNESS OF BREATH: 0
HEMATOCHEZIA: 0
ARTHRALGIAS: 0
HEMATURIA: 0
CHILLS: 0
DIZZINESS: 0
JOINT SWELLING: 0
NERVOUS/ANXIOUS: 0
MYALGIAS: 0
FREQUENCY: 1
DYSURIA: 1
DIARRHEA: 0
SORE THROAT: 0
EYE PAIN: 0
HEADACHES: 0
ABDOMINAL PAIN: 0
PALPITATIONS: 0
NAUSEA: 0
FEVER: 0
CONSTIPATION: 0
PARESTHESIAS: 0

## 2023-05-02 ASSESSMENT — PAIN SCALES - GENERAL: PAINLEVEL: NO PAIN (0)

## 2023-05-02 NOTE — PROGRESS NOTES
SUBJECTIVE:   CC: Andre is an 47 year old who presents for preventative health visit.       5/2/2023     3:21 PM   Additional Questions   Roomed by Mikala BROWN     Patient has been advised of split billing requirements and indicates understanding: Yes  Healthy Habits:     Getting at least 3 servings of Calcium per day:  Yes    Bi-annual eye exam:  Yes    Dental care twice a year:  Yes    Sleep apnea or symptoms of sleep apnea:  None    Diet:  Regular (no restrictions)    Frequency of exercise:  2-3 days/week    Duration of exercise:  Other    Taking medications regularly:  Yes    Medication side effects:  Not applicable    PHQ-2 Total Score: 0    Additional concerns today:  Yes  Hearing Problem  Pertinent negatives include no abdominal pain, arthralgias, chest pain, chills, congestion, coughing, fever, headaches, joint swelling, myalgias, nausea, rash, sore throat or weakness.     Patient continued to have smoking issue in the past he was prescribed Chantix however he has not completed or finish that.  Complains of some hearing issues on the left side would like to get it further evaluated.  Lifestyle is not very active.      Today's PHQ-2 Score:       5/1/2023     4:06 PM   PHQ-2 ( 1999 [employer])   Q1: Little interest or pleasure in doing things 0   Q2: Feeling down, depressed or hopeless 0   PHQ-2 Score 0   Q1: Little interest or pleasure in doing things Not at all   Q2: Feeling down, depressed or hopeless Not at all   PHQ-2 Score 0       Have you ever done Advance Care Planning? (For example, a Health Directive, POLST, or a discussion with a medical provider or your loved ones about your wishes):     Social History     Tobacco Use     Smoking status: Every Day     Packs/day: 0.50     Years: 20.00     Pack years: 10.00     Types: Cigarettes     Smokeless tobacco: Never   Vaping Use     Vaping status: Not on file   Substance Use Topics     Alcohol use: Yes             5/1/2023     4:05 PM   Alcohol Use   Prescreen: >3  "drinks/day or >7 drinks/week? No       Last PSA:   PSA   Date Value Ref Range Status   10/25/2019 4.43 (H) 0 - 4 ug/L Final     Comment:     Assay Method:  Chemiluminescence using Siemens Vista analyzer     PSA Tumor Marker   Date Value Ref Range Status   04/12/2022 7.30 (H) 0.00 - 4.00 ug/L Final       Reviewed orders with patient. Reviewed health maintenance and updated orders accordingly - Yes  Lab work is in process    Reviewed and updated as needed this visit by clinical staff   Tobacco  Allergies  Meds              Reviewed and updated as needed this visit by Provider                     Review of Systems   Constitutional: Negative for chills and fever.   HENT: Positive for hearing loss. Negative for congestion, ear pain and sore throat.    Eyes: Negative for pain and visual disturbance.   Respiratory: Negative for cough and shortness of breath.    Cardiovascular: Negative for chest pain, palpitations and peripheral edema.   Gastrointestinal: Negative for abdominal pain, constipation, diarrhea, heartburn, hematochezia and nausea.   Genitourinary: Positive for dysuria, frequency and urgency. Negative for genital sores, hematuria, impotence and penile discharge.   Musculoskeletal: Negative for arthralgias, joint swelling and myalgias.   Skin: Negative for rash.   Neurological: Negative for dizziness, weakness, headaches and paresthesias.   Psychiatric/Behavioral: Negative for mood changes. The patient is not nervous/anxious.        OBJECTIVE:   BP (!) 140/68   Pulse 77   Temp 97.4  F (36.3  C) (Tympanic)   Resp 18   Ht 1.735 m (5' 8.31\")   Wt 84.8 kg (187 lb)   SpO2 98%   BMI 28.18 kg/m      Physical Exam  GENERAL: healthy, alert and no distress  EYES: Eyes grossly normal to inspection, PERRL and conjunctivae and sclerae normal  HENT: ear canals and TM's normal, nose and mouth without ulcers or lesions  NECK: no adenopathy, no asymmetry, masses, or scars and thyroid normal to palpation  RESP: lungs " clear to auscultation - no rales, rhonchi or wheezes  CV: regular rate and rhythm, normal S1 S2, no S3 or S4, no murmur, click or rub, no peripheral edema and peripheral pulses strong  ABDOMEN: soft, nontender, no hepatosplenomegaly, no masses and bowel sounds normal  MS: no gross musculoskeletal defects noted, no edema  SKIN: no suspicious lesions or rashes  NEURO: Normal strength and tone, mentation intact and speech normal  PSYCH: mentation appears normal, affect normal/bright    Diagnostic Test Results:  Labs reviewed in Epic    ASSESSMENT/PLAN:   Andre was seen today for physical, hearing problem and cold exposure.    Diagnoses and all orders for this visit:    CARDIOVASCULAR SCREENING; LDL GOAL LESS THAN 160  -     Comprehensive metabolic panel; Future  -     Lipid panel reflex to direct LDL Fasting; Future    Encounter for annual physical exam  -     Hepatitis C Screen Reflex to HCV RNA Quant and Genotype; Future  -     Colonoscopy Screening  Referral; Future  -     Comprehensive metabolic panel; Future  -     Lipid panel reflex to direct LDL Fasting; Future    Need for hepatitis C screening test  -     Hepatitis C Screen Reflex to HCV RNA Quant and Genotype; Future    Screen for colon cancer  -     Colonoscopy Screening  Referral; Future    Screening for prostate cancer  -     Prostate Specific Antigen Screen; Future    Mixed conductive and sensorineural hearing loss of right ear, unspecified hearing status on contralateral side  -     Adult ENT  Referral; Future    Elevated prostate specific antigen (PSA)  Patient has seen urologist he would like to get a PSA labs done.  Currently denies any symptoms.  He has 2 biopsies and MRI done which were benign.  Other orders  -     REVIEW OF HEALTH MAINTENANCE PROTOCOL ORDERS  -     PRIMARY CARE FOLLOW-UP SCHEDULING; Future        Patient has been advised of split billing requirements and indicates understanding: Yes      COUNSELING:  "  Reviewed preventive health counseling, as reflected in patient instructions       Regular exercise       Healthy diet/nutrition      BMI:   Estimated body mass index is 28.18 kg/m  as calculated from the following:    Height as of this encounter: 1.735 m (5' 8.31\").    Weight as of this encounter: 84.8 kg (187 lb).         He reports that he has been smoking cigarettes. He has a 10.00 pack-year smoking history. He has never used smokeless tobacco.  Nicotine/Tobacco Cessation Plan:   Information offered: Patient not interested at this time          Conor Corea MD  [facility]  "

## 2023-05-17 ENCOUNTER — LAB (OUTPATIENT)
Dept: LAB | Facility: CLINIC | Age: 48
End: 2023-05-17
Payer: COMMERCIAL

## 2023-05-17 DIAGNOSIS — Z13.6 CARDIOVASCULAR SCREENING; LDL GOAL LESS THAN 160: ICD-10-CM

## 2023-05-17 DIAGNOSIS — Z00.00 ENCOUNTER FOR ANNUAL PHYSICAL EXAM: ICD-10-CM

## 2023-05-17 DIAGNOSIS — Z12.5 SCREENING FOR PROSTATE CANCER: ICD-10-CM

## 2023-05-17 DIAGNOSIS — Z11.59 NEED FOR HEPATITIS C SCREENING TEST: ICD-10-CM

## 2023-05-17 LAB
ALBUMIN SERPL BCG-MCNC: 4.8 G/DL (ref 3.5–5.2)
ALP SERPL-CCNC: 67 U/L (ref 40–129)
ALT SERPL W P-5'-P-CCNC: 21 U/L (ref 10–50)
ANION GAP SERPL CALCULATED.3IONS-SCNC: 14 MMOL/L (ref 7–15)
AST SERPL W P-5'-P-CCNC: 20 U/L (ref 10–50)
BILIRUB SERPL-MCNC: 0.7 MG/DL
BUN SERPL-MCNC: 11.1 MG/DL (ref 6–20)
CALCIUM SERPL-MCNC: 9.9 MG/DL (ref 8.6–10)
CHLORIDE SERPL-SCNC: 103 MMOL/L (ref 98–107)
CHOLEST SERPL-MCNC: 223 MG/DL
CREAT SERPL-MCNC: 1.09 MG/DL (ref 0.67–1.17)
DEPRECATED HCO3 PLAS-SCNC: 25 MMOL/L (ref 22–29)
GFR SERPL CREATININE-BSD FRML MDRD: 84 ML/MIN/1.73M2
GLUCOSE SERPL-MCNC: 89 MG/DL (ref 70–99)
HDLC SERPL-MCNC: 54 MG/DL
LDLC SERPL CALC-MCNC: 154 MG/DL
NONHDLC SERPL-MCNC: 169 MG/DL
POTASSIUM SERPL-SCNC: 4.1 MMOL/L (ref 3.4–5.3)
PROT SERPL-MCNC: 7.2 G/DL (ref 6.4–8.3)
SODIUM SERPL-SCNC: 142 MMOL/L (ref 136–145)
TRIGL SERPL-MCNC: 74 MG/DL

## 2023-05-17 PROCEDURE — 80053 COMPREHEN METABOLIC PANEL: CPT

## 2023-05-17 PROCEDURE — 80061 LIPID PANEL: CPT

## 2023-05-17 PROCEDURE — G0103 PSA SCREENING: HCPCS

## 2023-05-17 PROCEDURE — 86803 HEPATITIS C AB TEST: CPT

## 2023-05-17 PROCEDURE — 36415 COLL VENOUS BLD VENIPUNCTURE: CPT

## 2023-05-18 LAB
HCV AB SERPL QL IA: NONREACTIVE
PSA SERPL DL<=0.01 NG/ML-MCNC: 8.8 NG/ML (ref 0–2.5)

## 2023-06-01 ENCOUNTER — OFFICE VISIT (OUTPATIENT)
Dept: UROLOGY | Facility: CLINIC | Age: 48
End: 2023-06-01
Payer: COMMERCIAL

## 2023-06-01 VITALS
WEIGHT: 185 LBS | DIASTOLIC BLOOD PRESSURE: 80 MMHG | BODY MASS INDEX: 28.04 KG/M2 | SYSTOLIC BLOOD PRESSURE: 132 MMHG | HEIGHT: 68 IN | OXYGEN SATURATION: 99 % | HEART RATE: 91 BPM

## 2023-06-01 DIAGNOSIS — N40.0 ENLARGED PROSTATE: Primary | ICD-10-CM

## 2023-06-01 DIAGNOSIS — R97.20 ELEVATED PROSTATE SPECIFIC ANTIGEN (PSA): ICD-10-CM

## 2023-06-01 LAB
ALBUMIN UR-MCNC: NEGATIVE MG/DL
APPEARANCE UR: CLEAR
BILIRUB UR QL STRIP: NEGATIVE
COLOR UR AUTO: YELLOW
GLUCOSE UR STRIP-MCNC: NEGATIVE MG/DL
HGB UR QL STRIP: ABNORMAL
KETONES UR STRIP-MCNC: NEGATIVE MG/DL
LEUKOCYTE ESTERASE UR QL STRIP: NEGATIVE
NITRATE UR QL: NEGATIVE
PH UR STRIP: 6.5 [PH] (ref 5–7)
PSA SERPL-MCNC: 7.2 UG/L (ref 0–4)
RESIDUAL VOLUME (RV) (EXTERNAL): 23
SP GR UR STRIP: 1.02 (ref 1–1.03)
UROBILINOGEN UR STRIP-ACNC: 0.2 E.U./DL

## 2023-06-01 PROCEDURE — 51798 US URINE CAPACITY MEASURE: CPT | Performed by: UROLOGY

## 2023-06-01 PROCEDURE — 99213 OFFICE O/P EST LOW 20 MIN: CPT | Mod: 25 | Performed by: UROLOGY

## 2023-06-01 PROCEDURE — 84153 ASSAY OF PSA TOTAL: CPT | Performed by: UROLOGY

## 2023-06-01 PROCEDURE — 81003 URINALYSIS AUTO W/O SCOPE: CPT | Performed by: UROLOGY

## 2023-06-01 PROCEDURE — 36415 COLL VENOUS BLD VENIPUNCTURE: CPT | Performed by: UROLOGY

## 2023-06-01 ASSESSMENT — PAIN SCALES - GENERAL: PAINLEVEL: NO PAIN (0)

## 2023-06-01 NOTE — LETTER
6/1/2023       RE: Andre Rose  [address] [address]     Dear Colleague,    Thank you for referring your patient, Andre Rose, to the [facility] at [facility]. Please see a copy of my visit note below.    Office Visit Note  [facility]  [phone]    UROLOGIC DIAGNOSES:   Enlarged prostate  Elevated PSA    CURRENT INTERVENTIONS:   MRI prostate x2  Negative prostate biopsy x2      HISTORY:   Andre returns to clinic today because his PSA has risen again.  The PSA was 8.82 weeks ago.  He has not taken Flomax.  He still has some minor urinary complaints but overall nothing is too bothersome.  I recommended that we recheck the PSA today before I examined him and the PSA today was checked and it is back down to 7.2.      PAST MEDICAL HISTORY:   Past Medical History:   Diagnosis Date    Anal fistula     Blood in semen     Epididymitis, bilateral     Former smoker quit 3/10    16 pack year history    Headaches     Hematospermia     Orchitis, epididymitis, and epididymo-orchitis, with abscess     Overweight (BMI 25.0-29.9)     Penile discharge     Premature ejaculation 10/10/2007    Snores        PAST SURGICAL HISTORY:   Past Surgical History:   Procedure Laterality Date    ADENOIDECTOMY      EXAM UNDER ANESTHESIA RECTUM  8/25/2012    Procedure: EXAM UNDER ANESTHESIA RECTUM;  EXAM UNDER ANESTHESIA, NO EVIDENCE OF FISTULA FOUND;  Surgeon: Goldberg, Stanley Morton, MD;  Location: [facility]    FISTULOTOMY RECTUM  12/10/2011    Procedure:FISTULOTOMY RECTUM; Surgeon:GOLDBERG, STANLEY MORTON; Location:[facility]    FISTULOTOMY RECTUM  2/9/2013    Procedure: FISTULOTOMY RECTUM;  FISTULOTOMY;  Surgeon: Goldberg, Stanley Morton, MD;  Location: [facility]    REMV PILONIDAL LESION SIMPLE      ZZHC COLONOSCOPY THRU STOMA, DIAGNOSTIC  2009    repeat age 50       FAMILY HISTORY:   Family History   Problem Relation Age of Onset     Hypertension Mother     Lung Cancer Mother     Prostate Cancer Maternal Uncle         ~52    Prostate Cancer Paternal Grandfather     Alzheimer Disease Maternal Grandfather     Cancer - colorectal No family hx of     Gastrointestinal Disease No family hx of     Diabetes No family hx of     C.A.D. No family hx of     Asthma No family hx of     Cerebrovascular Disease No family hx of        SOCIAL HISTORY:   Social History     Socioeconomic History    Marital status:     Number of children: 2   Occupational History     Employer: [employer]   Tobacco Use    Smoking status: Every Day     Packs/day: 0.50     Years: 20.00     Pack years: 10.00     Types: Cigarettes    Smokeless tobacco: Never   Substance and Sexual Activity    Alcohol use: Yes    Drug use: No    Sexual activity: Yes     Partners: Female       Review Of Systems:  Skin: No rash, pruritis, or skin pigmentation  Eyes: No changes in vision  Ears/Nose/Throat: No changes in hearing, no nosebleeds  Respiratory: No shortness of breath, dyspnea on exertion, cough, or hemoptysis  Cardiovascular: No chest pain or palpitations  Gastrointestinal: No diarrhea or constipation. No abdominal pain. No hematochezia  Genitourinary: see HPI  Musculoskeletal: No pain or swelling of joints, normal range of motion  Neurologic: No weakness or tremors  Psychiatric: No recent changes in memory or mood  Hematologic/Lymphatic/Immunologic: No easy bruising or enlarged lymph nodes  Endocrine: No weight gain or loss      PHYSICAL EXAM:    There were no vitals taken for this visit.    Constitutional: Well developed. Conversant and in no acute distress  Eyes: Anicteric sclera, conjunctiva clear, normal extraocular movements  ENT: Normocephalic and atraumatic,   Skin: Warm and dry. No rashes or lesions  Cardiac: No peripheral edema  Back/Flank: Not done  CNS/PNS: Normal musculature and movements, moves all extremities normally  Respiratory: Normal non-labored breathing  Abdomen: Soft  nontender and nondistended  Peripheral Vascular: No peripheral edema  Mental Status/Psych: Alert and Oriented x 3. Normal mood and affect    Penis: Not done  Scrotal Skin: Not done  Testicles: Not done  Epididymis: Not done  Digital Rectal Exam: The prostate is moderately enlarged, benign and symmetric to palpation    Cystoscopy: Not done    Imaging: None    Urinalysis: UA RESULTS:  Recent Labs   Lab Test 01/11/22  0841   COLOR Yellow   APPEARANCE Clear   URINEGLC Negative   URINEBILI Negative   URINEKETONE Negative   SG 1.025   UBLD Negative   URINEPH 7.0   PROTEIN Negative   UROBILINOGEN 0.2   NITRITE Negative   LEUKEST Negative       PSA: 7.2    Post Void Residual: 23mL    Other labs: None today      IMPRESSION:  Elevated PSA  Enlarged prostate    PLAN:  The PSA is back down to the same level as last year.  I recommended continued annual surveillance.  I will see him back again in 1 year.      Hamzah Bowen M.D.

## 2023-06-01 NOTE — NURSING NOTE
Chief Complaint   Patient presents with     Elevated PSA       Patient here to discuss his PSA result   Patient state he doing well  Patient had a bladder scan  PVR 23      FREIDA Stanley

## 2023-06-01 NOTE — PROGRESS NOTES
Office Visit Note  [facility]  [phone]    UROLOGIC DIAGNOSES:   Enlarged prostate  Elevated PSA    CURRENT INTERVENTIONS:   MRI prostate x2  Negative prostate biopsy x2      HISTORY:   Andre returns to clinic today because his PSA has risen again.  The PSA was 8.82 weeks ago.  He has not taken Flomax.  He still has some minor urinary complaints but overall nothing is too bothersome.  I recommended that we recheck the PSA today before I examined him and the PSA today was checked and it is back down to 7.2.      PAST MEDICAL HISTORY:   Past Medical History:   Diagnosis Date     Anal fistula      Blood in semen      Epididymitis, bilateral      Former smoker quit 3/10    16 pack year history     Headaches      Hematospermia      Orchitis, epididymitis, and epididymo-orchitis, with abscess      Overweight (BMI 25.0-29.9)      Penile discharge      Premature ejaculation 10/10/2007     Snores        PAST SURGICAL HISTORY:   Past Surgical History:   Procedure Laterality Date     ADENOIDECTOMY       EXAM UNDER ANESTHESIA RECTUM  8/25/2012    Procedure: EXAM UNDER ANESTHESIA RECTUM;  EXAM UNDER ANESTHESIA, NO EVIDENCE OF FISTULA FOUND;  Surgeon: Goldberg, Stanley Morton, MD;  Location: [facility]     FISTULOTOMY RECTUM  12/10/2011    Procedure:FISTULOTOMY RECTUM; Surgeon:GOLDBERG, STANLEY MORTON; Location:[facility]     FISTULOTOMY RECTUM  2/9/2013    Procedure: FISTULOTOMY RECTUM;  FISTULOTOMY;  Surgeon: Goldberg, Stanley Morton, MD;  Location: [facility]     REMV PILONIDAL LESION SIMPLE       ZZHC COLONOSCOPY THRU STOMA, DIAGNOSTIC  2009    repeat age 50       FAMILY HISTORY:   Family History   Problem Relation Age of Onset     Hypertension Mother      Lung Cancer Mother      Prostate Cancer Maternal Uncle         ~52     Prostate Cancer Paternal Grandfather      Alzheimer Disease Maternal Grandfather      Cancer - colorectal No family hx of      Gastrointestinal Disease No family hx of      Diabetes No family hx of       C.A.D. No family hx of      Asthma No family hx of      Cerebrovascular Disease No family hx of        SOCIAL HISTORY:   Social History     Socioeconomic History     Marital status:      Number of children: 2   Occupational History     Employer: [employer]   Tobacco Use     Smoking status: Every Day     Packs/day: 0.50     Years: 20.00     Pack years: 10.00     Types: Cigarettes     Smokeless tobacco: Never   Substance and Sexual Activity     Alcohol use: Yes     Drug use: No     Sexual activity: Yes     Partners: Female       Review Of Systems:  Skin: No rash, pruritis, or skin pigmentation  Eyes: No changes in vision  Ears/Nose/Throat: No changes in hearing, no nosebleeds  Respiratory: No shortness of breath, dyspnea on exertion, cough, or hemoptysis  Cardiovascular: No chest pain or palpitations  Gastrointestinal: No diarrhea or constipation. No abdominal pain. No hematochezia  Genitourinary: see HPI  Musculoskeletal: No pain or swelling of joints, normal range of motion  Neurologic: No weakness or tremors  Psychiatric: No recent changes in memory or mood  Hematologic/Lymphatic/Immunologic: No easy bruising or enlarged lymph nodes  Endocrine: No weight gain or loss      PHYSICAL EXAM:    There were no vitals taken for this visit.    Constitutional: Well developed. Conversant and in no acute distress  Eyes: Anicteric sclera, conjunctiva clear, normal extraocular movements  ENT: Normocephalic and atraumatic,   Skin: Warm and dry. No rashes or lesions  Cardiac: No peripheral edema  Back/Flank: Not done  CNS/PNS: Normal musculature and movements, moves all extremities normally  Respiratory: Normal non-labored breathing  Abdomen: Soft nontender and nondistended  Peripheral Vascular: No peripheral edema  Mental Status/Psych: Alert and Oriented x 3. Normal mood and affect    Penis: Not done  Scrotal Skin: Not done  Testicles: Not done  Epididymis: Not done  Digital Rectal Exam: The prostate is moderately enlarged,  benign and symmetric to palpation    Cystoscopy: Not done    Imaging: None    Urinalysis: UA RESULTS:  Recent Labs   Lab Test 01/11/22  0841   COLOR Yellow   APPEARANCE Clear   URINEGLC Negative   URINEBILI Negative   URINEKETONE Negative   SG 1.025   UBLD Negative   URINEPH 7.0   PROTEIN Negative   UROBILINOGEN 0.2   NITRITE Negative   LEUKEST Negative       PSA: 7.2    Post Void Residual: 23mL    Other labs: None today      IMPRESSION:  Elevated PSA  Enlarged prostate    PLAN:  The PSA is back down to the same level as last year.  I recommended continued annual surveillance.  I will see him back again in 1 year.      Hamzah Bowen M.D.

## 2023-06-30 DIAGNOSIS — N40.0 ENLARGED PROSTATE: Primary | ICD-10-CM

## 2023-06-30 RX ORDER — TAMSULOSIN HYDROCHLORIDE 0.4 MG/1
0.4 CAPSULE ORAL DAILY
Qty: 90 CAPSULE | Refills: 3 | Status: SHIPPED | OUTPATIENT
Start: 2023-06-30

## 2024-03-04 ENCOUNTER — HOSPITAL ENCOUNTER (OUTPATIENT)
Facility: CLINIC | Age: 49
End: 2024-03-04
Attending: COLON & RECTAL SURGERY | Admitting: COLON & RECTAL SURGERY
Payer: COMMERCIAL

## 2024-03-04 ENCOUNTER — TELEPHONE (OUTPATIENT)
Dept: GASTROENTEROLOGY | Facility: CLINIC | Age: 49
End: 2024-03-04
Payer: COMMERCIAL

## 2024-03-04 NOTE — TELEPHONE ENCOUNTER
"Endoscopy Scheduling Screen    Have you had a positive Covid test in the last 14 days?  No    Are you active on MyChart?   Yes    What insurance is in the chart?  Other:  [employer]     Ordering/Referring Provider: JOSE A MUNOZ   (If ordering provider performs procedure, schedule with ordering provider unless otherwise instructed. )    BMI: Estimated body mass index is 28.13 kg/m  as calculated from the following:    Height as of 6/1/23: 1.727 m (5' 8\").    Weight as of 6/1/23: 83.9 kg (185 lb).     Sedation Ordered  moderate sedation.   If patient BMI > 50 do not schedule in [facility].    If patient BMI > 45 do not schedule at [facility].    Are you taking methadone or Suboxone?  No    Have you had difficulties, pain, or discomfort during past endoscopy procedures?  No    Are you taking any prescription medications for pain 3 or more times per week?   NO - No RN review required.    Do you have a history of malignant hyperthermia or adverse reaction to anesthesia?  No    (Females) Are you currently pregnant?   No     Have you been diagnosed or told you have pulmonary hypertension?   No    Do you have an LVAD?  No    Have you been told you have moderate to severe sleep apnea?  No    Have you been told you have COPD, asthma, or any other lung disease?  No    Do you have any heart conditions?  No     Have you ever had an organ transplant?   No    Have you ever had or are you awaiting a heart or lung transplant?   No    Have you had a stroke or transient ischemic attack (TIA aka \"mini stroke\" in the last 6 months?   No    Have you been diagnosed with or been told you have cirrhosis of the liver?   No    Are you currently on dialysis?   No    Do you need assistance transferring?   No    BMI: Estimated body mass index is 28.13 kg/m  as calculated from the following:    Height as of 6/1/23: 1.727 m (5' 8\").    Weight as of 6/1/23: 83.9 kg (185 lb).     Is patients BMI > 40 and scheduling location [facility]?  No    Do you take an injectable " medication for weight loss or diabetes (excluding insulin)?  No    Do you take the medication Naltrexone?  No    Do you take blood thinners?  No       Prep   Are you currently on dialysis or do you have chronic kidney disease?  No    Do you have a diagnosis of diabetes?  No    Do you have a diagnosis of cystic fibrosis (CF)?  No    On a regular basis do you go 3 -5 days between bowel movements?  No    BMI > 40?  No    Preferred Pharmacy:    [id number] - [place], [place] - [address] AT [facility] OF  [address]  [address] [facility] [place] [address]  Phone: [phone] Fax: [phone]      Final Scheduling Details   Colonoscopy prep sent?  N/A    Procedure scheduled  Colonoscopy    Surgeon:  RADHA     Date of procedure:  6/11/24     Pre-OP / PAC:   No - Not required for this site.    Location  [facility] - Per order.    Sedation   Moderate Sedation - Per order.      Patient Reminders:   You will receive a call from a Nurse to review instructions and health history.  This assessment must be completed prior to your procedure.  Failure to complete the Nurse assessment may result in the procedure being cancelled.      On the day of your procedure, please designate an adult(s) who can drive you home stay with you for the next 24 hours. The medicines used in the exam will make you sleepy. You will not be able to drive.      You cannot take public transportation, ride share services, or non-medical taxi service without a responsible caregiver.  Medical transport services are allowed with the requirement that a responsible caregiver will receive you at your destination.  We require that drivers and caregivers are confirmed prior to your procedure.

## 2024-04-11 NOTE — TELEPHONE ENCOUNTER
Caller: Andre    Reason for Reschedule/Cancellation   (please be detailed, any staff messages or encounters to note?): date does not work      Prior to reschedule please review:  Ordering Provider: JOSE A MUNOZ   Sedation Determined: CS  Does patient have any ASC Exclusions, please identify?: no      Notes on Cancelled Procedure:  Procedure: Lower Endoscopy [Colonoscopy]   Date: 6/11  Location: [facility]; [address] [place] [address]   Surgeon: Zulema      Rescheduled: Yes,   Procedure: Lower Endoscopy [Colonoscopy]    Date: 7/31   Location: [facility]; [address] [place] [address]    Surgeon: Tushar   Sedation Level Scheduled  CS ,  Reason for Sedation Level no   Instructions updated and sent: y       Does patient need PAC or Pre -Op Rescheduled? : n         Did you cancel or rescheduled an EUS procedure? No.

## 2024-04-13 ENCOUNTER — HOSPITAL ENCOUNTER (EMERGENCY)
Facility: CLINIC | Age: 49
Discharge: HOME | End: 2024-04-13
Attending: EMERGENCY MEDICINE | Admitting: EMERGENCY MEDICINE
Payer: COMMERCIAL

## 2024-04-13 VITALS
SYSTOLIC BLOOD PRESSURE: 146 MMHG | OXYGEN SATURATION: 96 % | DIASTOLIC BLOOD PRESSURE: 90 MMHG | TEMPERATURE: 98.5 F | RESPIRATION RATE: 16 BRPM | HEART RATE: 114 BPM

## 2024-04-13 DIAGNOSIS — R33.9 URINARY RETENTION: ICD-10-CM

## 2024-04-13 LAB
ALBUMIN UR-MCNC: NEGATIVE MG/DL
APPEARANCE UR: CLEAR
BACTERIA #/AREA URNS HPF: ABNORMAL /HPF
BILIRUB UR QL STRIP: NEGATIVE
COLOR UR AUTO: ABNORMAL
GLUCOSE UR STRIP-MCNC: NEGATIVE MG/DL
HGB UR QL STRIP: ABNORMAL
KETONES UR STRIP-MCNC: 20 MG/DL
LEUKOCYTE ESTERASE UR QL STRIP: NEGATIVE
MUCOUS THREADS #/AREA URNS LPF: PRESENT /LPF
NITRATE UR QL: NEGATIVE
PH UR STRIP: 5 [PH] (ref 5–7)
RBC URINE: 45 /HPF
SP GR UR STRIP: 1.01 (ref 1–1.03)
UROBILINOGEN UR STRIP-MCNC: NORMAL MG/DL
WBC URINE: <1 /HPF

## 2024-04-13 PROCEDURE — 81001 URINALYSIS AUTO W/SCOPE: CPT | Performed by: EMERGENCY MEDICINE

## 2024-04-13 PROCEDURE — 99283 EMERGENCY DEPT VISIT LOW MDM: CPT

## 2024-04-13 ASSESSMENT — ACTIVITIES OF DAILY LIVING (ADL)
ADLS_ACUITY_SCORE: 35

## 2024-04-13 NOTE — ED PROVIDER NOTES
History     Chief Complaint:  Urinary Retention       The history is provided by the patient.      Andre Rose is a 48 year old male who presents with urinary retention. Patient reports waking up several times throughout the night unable to void. This morning, he has been in extreme discomfort. He did try taking baths this morning with no relief. Notes history of BPH with occasional retention, but is on medications to control this and has never had retention this bad. Denies fever or dysuria. Notes he did have an MRI with contrast yesterday, and did drink last night.     Independent Historian:   None - Patient Only    Review of External Notes:   None    Medications:    Tamsulosin    Past Medical History:    Anal fissure  Anal fistula   Premature ejaculation  Hematospermia   Former smoker  Orchitis, epididymitis, epididymo-orchitis, with abscess  Obesity  Internal hemorrhoids  Rectal fistula    Past Surgical History:    Adenoidectomy   Fistulotomy rectum x 2  Colonoscopy   Removal of pilonidal lesion     Physical Exam   Patient Vitals for the past 24 hrs:   BP Temp Pulse Resp SpO2   04/13/24 1248 (!) 146/90 98.5  F (36.9  C) 114 16 96 %        Physical Exam    Eye:  Pupils are equal, round, and reactive.  Extraocular movements intact.    ENT:  No rhinorrhea.  Moist mucus membranes.  Normal tongue and tonsil.    Cardiac:  Regular rate and rhythm.  No murmurs, gallops, or rubs.    Pulmonary:  Clear to auscultation bilaterally.  No wheezes, rales, or rhonchi.    Abdomen:  Positive bowel sounds.  Abdomen is soft and non-distended, without focal tenderness.    Musculoskeletal:  Normal movement of all extremities without evidence for deficit.    Skin:  Warm and dry without rashes.    Neurologic:  Non-focal exam without asymmetric weakness or numbness.     Psychiatric:  Normal affect with appropriate interaction with examiner.    Emergency Department Course   Imaging:  No orders to display      Laboratory:  Labs  Ordered and Resulted from Time of ED Arrival to Time of ED Departure - No data to display     Procedures   None    Emergency Department Course & Assessments:    Interventions:  Medications - No data to display     Independent Interpretation (X-rays, CTs, rhythm strip):  None    Assessments/Consultations/Discussion of Management or Tests:   ED Course as of 04/13/24 1354   Sat Apr 13, 2024   1353 I evaluated the patient and obtained history as noted above.       Social Determinants of Health affecting care:   None    Disposition:  The patient was discharged.     Impression & Plan    CMS Diagnoses: None     Medical Decision Making:  This 48-year-old male with known BPH presents to us with urinary retention.  He underwent an MRI yesterday with contrast.  He notes that since this morning, he is unable to pass any significant amount of urine.  He presents to us rather uncomfortable with over 650 cc in his bladder.    The patient underwent a straight cath with complete removal of all urine.  There was a discussion about leaving a catheter in.  However, the patient would prefer to be discharged without a catheter to see how this progresses.  I do not feel this is unreasonable, though I was very clear with the patient there is a significant possibility that he may return to the ER unable to urinate again and would require another catheter.  He understands this and plans for discharge home.  Otherwise, his urine is reassuring.  I have no other worries of other sinister causes of urinary retention.  He will follow-up with urology with immediate return for inability to urinate or any other emergent concerns    Diagnosis:    ICD-10-CM    1. Urinary retention  R33.9            Discharge Medications:  New Prescriptions    No medications on file      Scribe Disclosure:  Sindhu MALLORY, am serving as a scribe at 1:36 PM on 4/13/2024 to document services personally performed by Trierweiler, Chad A, MD based on my observations and  the provider's statements to me.    4/13/2024   Trierweiler, Chad A, MD Trierweiler, Chad A, MD  04/14/24 6507

## 2024-04-14 ENCOUNTER — NURSE TRIAGE (OUTPATIENT)
Dept: NURSING | Facility: CLINIC | Age: 49
End: 2024-04-14
Payer: COMMERCIAL

## 2024-04-14 ENCOUNTER — HOSPITAL ENCOUNTER (EMERGENCY)
Facility: CLINIC | Age: 49
Discharge: HOME | End: 2024-04-14
Attending: EMERGENCY MEDICINE | Admitting: EMERGENCY MEDICINE
Payer: COMMERCIAL

## 2024-04-14 VITALS
TEMPERATURE: 97.5 F | SYSTOLIC BLOOD PRESSURE: 123 MMHG | DIASTOLIC BLOOD PRESSURE: 87 MMHG | RESPIRATION RATE: 18 BRPM | OXYGEN SATURATION: 100 % | HEART RATE: 79 BPM

## 2024-04-14 DIAGNOSIS — R33.9 URINARY RETENTION: ICD-10-CM

## 2024-04-14 LAB
ALBUMIN UR-MCNC: NEGATIVE MG/DL
APPEARANCE UR: CLEAR
BILIRUB UR QL STRIP: NEGATIVE
COLOR UR AUTO: ABNORMAL
GLUCOSE UR STRIP-MCNC: NEGATIVE MG/DL
HGB UR QL STRIP: ABNORMAL
KETONES UR STRIP-MCNC: 40 MG/DL
LEUKOCYTE ESTERASE UR QL STRIP: NEGATIVE
MUCOUS THREADS #/AREA URNS LPF: PRESENT /LPF
NITRATE UR QL: NEGATIVE
PH UR STRIP: 5.5 [PH] (ref 5–7)
RBC URINE: 7 /HPF
SP GR UR STRIP: 1.02 (ref 1–1.03)
SQUAMOUS EPITHELIAL: <1 /HPF
UROBILINOGEN UR STRIP-MCNC: NORMAL MG/DL
WBC URINE: 1 /HPF

## 2024-04-14 PROCEDURE — 51702 INSERT TEMP BLADDER CATH: CPT

## 2024-04-14 PROCEDURE — 99284 EMERGENCY DEPT VISIT MOD MDM: CPT

## 2024-04-14 PROCEDURE — 81001 URINALYSIS AUTO W/SCOPE: CPT | Performed by: EMERGENCY MEDICINE

## 2024-04-14 PROCEDURE — 87086 URINE CULTURE/COLONY COUNT: CPT | Performed by: EMERGENCY MEDICINE

## 2024-04-14 PROCEDURE — 51798 US URINE CAPACITY MEASURE: CPT

## 2024-04-14 PROCEDURE — 250N000009 HC RX 250: Performed by: EMERGENCY MEDICINE

## 2024-04-14 RX ORDER — LIDOCAINE HYDROCHLORIDE 20 MG/ML
10 JELLY TOPICAL ONCE
Status: COMPLETED | OUTPATIENT
Start: 2024-04-14 | End: 2024-04-14

## 2024-04-14 RX ADMIN — LIDOCAINE HYDROCHLORIDE 10 ML: 20 JELLY TOPICAL at 07:30

## 2024-04-14 ASSESSMENT — COLUMBIA-SUICIDE SEVERITY RATING SCALE - C-SSRS
2. HAVE YOU ACTUALLY HAD ANY THOUGHTS OF KILLING YOURSELF IN THE PAST MONTH?: NO
6. HAVE YOU EVER DONE ANYTHING, STARTED TO DO ANYTHING, OR PREPARED TO DO ANYTHING TO END YOUR LIFE?: NO
1. IN THE PAST MONTH, HAVE YOU WISHED YOU WERE DEAD OR WISHED YOU COULD GO TO SLEEP AND NOT WAKE UP?: NO

## 2024-04-14 ASSESSMENT — ACTIVITIES OF DAILY LIVING (ADL)
ADLS_ACUITY_SCORE: 35
ADLS_ACUITY_SCORE: 35

## 2024-04-14 NOTE — ED PROVIDER NOTES
History     Chief Complaint:  No chief complaint on file.       HPI   Andre Rose is a 48 year old male who presents to the ED with urinary retention.  Patient was in this emergency department yesterday for acute urinary retention and underwent Velazquez catheterization for 800 mL of urine in the bladder.  His urine showed some mild hematuria without pyuria.  Culture was not obtained.  The blood was thought more than likely secondary to the trauma of the Velazquez placement.  The patient does have a history of BPH and takes tamsulosin daily.  Patient notes there was possibly some misunderstanding yesterday between the doctor and the nurse because his Velazquez catheter was removed shortly after it was placed and his bladder was drained.  He was not sent home with a catheter.  He was able to urinate a few times a small amount during the evening.  He did have some dysuria with this.  He noticed overnight that he was really unable to urinate.  He came in this morning again with recurrent acute urinary retention.  I performed a bladder scan on my initial interview and the patient had approximately 480 mL of urine in the bladder.  No fevers chills or sweats.  Was not placed on antibiotics yesterday.      Independent Historian:   None    Review of External Notes:  I did review the emergency department encounter yesterday including his urinalysis and emergency physician note.    Allergies:  Penicillins  Seasonal Allergies     Listed Medications:    aspirin 81 MG EC tablet  tamsulosin (FLOMAX) 0.4 MG capsule        Past Medical and Surgical History:    Past Medical History:   Diagnosis Date    Anal fistula     Blood in semen     Epididymitis, bilateral     Former smoker quit 3/10    Headaches     Hematospermia     Orchitis, epididymitis, and epididymo-orchitis, with abscess     Overweight (BMI 25.0-29.9)     Penile discharge     Premature ejaculation 10/10/2007    Snores      Past Surgical History:   Procedure Laterality Date     ADENOIDECTOMY      EXAM UNDER ANESTHESIA RECTUM  8/25/2012    Procedure: EXAM UNDER ANESTHESIA RECTUM;  EXAM UNDER ANESTHESIA, NO EVIDENCE OF FISTULA FOUND;  Surgeon: Goldberg, Stanley Morton, MD;  Location:  [facility]    FISTULOTOMY RECTUM  12/10/2011    Procedure:FISTULOTOMY RECTUM; Surgeon:GOLDBERG, STANLEY MORTON; Location: [facility]    FISTULOTOMY RECTUM  2/9/2013    Procedure: FISTULOTOMY RECTUM;  FISTULOTOMY;  Surgeon: Goldberg, Stanley Morton, MD;  Location: [facility]    REMV PILONIDAL LESION SIMPLE      ZZHC COLONOSCOPY THRU STOMA, DIAGNOSTIC  2009    repeat age 50        Family History:    family history includes Alzheimer Disease in his maternal grandfather; Hypertension in his mother; Lung Cancer in his mother; Prostate Cancer in his maternal uncle and paternal grandfather.    Social History:   reports that he has been smoking cigarettes. He has a 10 pack-year smoking history. He has never used smokeless tobacco. He reports current alcohol use. He reports that he does not use drugs.      Physical Exam   Patient Vitals for the past 24 hrs:   BP Pulse SpO2   04/14/24 0709 -- -- 100 %   04/14/24 0704 (!) 139/101 97 --        General: Resting uncomfortably on the gurney  Head:  The scalp, face, and head appear normal  Eyes:  The pupils are equal, round, and reactive to light    There is no nystagmus    Extraocular muscles are intact    Conjunctivae and sclerae are normal  ENT:    The nose is normal    Pinnae are normal    The oropharynx is normal  Neck:  Normal range of motion    There is no rigidity noted  CV:  Regular rate  Normal underlying rhythm     Normal S1/S2    No pathological murmur detected  Resp:  Lungs are clear    There is no tachypnea    Non-labored    No rales    No wheezing   GI:  Abdomen is soft, there is no rigidity    Suprapubic fullness secondary to urinary retention    No distension/tympani    No rebound tenderness     Non-surgical without peritoneal features at this time  Rectal exam no evidence of  acute prostatitis.  There is BPH noted.  Genitourinary penis is normal.  There is trace blood tingeing at the urethral meatus.  Circumcised.  Testicles normal.  No hernia.  MS:  Normal muscular tone    Symmetric motor strength    No major joint effusions    No asymmetric leg swelling, no calf tenderness  Skin:  No rash or acute skin lesions noted  Neuro:  Speech is normal and fluent, there is no aphasia    No motor deficits    Cranial nerves are intact  Psych: Awake. Alert.      Normal affect.  Appropriate interactions.            Emergency Department Course   EKG:  ECG results from 02/22/21   EKG 12-lead, tracing only     Value    Interpretation ECG Click View Image link to view waveform and result        Imaging:  No orders to display        Reports in this section have been read by the radiologist.    Laboratory:  Labs Ordered and Resulted from Time of ED Arrival to Time of ED Departure   ROUTINE UA WITH MICROSCOPIC REFLEX TO CULTURE - Abnormal       Result Value    Color Urine Light Yellow      Appearance Urine Clear      Glucose Urine Negative      Bilirubin Urine Negative      Ketones Urine 40 (*)     Specific Gravity Urine 1.022      Blood Urine Small (*)     pH Urine 5.5      Protein Albumin Urine Negative      Urobilinogen Urine Normal      Nitrite Urine Negative      Leukocyte Esterase Urine Negative      Mucus Urine Present (*)     RBC Urine 7 (*)     WBC Urine 1      Squamous Epithelials Urine <1     URINE CULTURE        Procedures:  Procedures       Emergency Department Course & Assessments:             Interventions/ED Medications:  Medications   lidocaine (XYLOCAINE) 2 % external gel 10 mL (10 mLs Urethral $Given 4/14/24 4901)          Independent Interpretation of Radiology Studies by Dr. Boucher      Assessments/Consultations/Discussion of Management:     I reassessed the patient at 814.  His bladder is empty and he is feeling better.  A leg bag is being placed.    Disposition:  Home    Impression &  Plan        Medical Decision Making:  This patient presents to the emergency department with recurrent urinary retention.  He was here yesterday and had a catheter placed but it was removed.  He came back today with recurrent urinary retention with approximately 480 mL noted.  He has some ongoing very mild microhematuria likely from the trauma of the catheter placed yesterday.  The microhematuria has improved.  His urine has been set up for culture.  Antibiotics are not indicated at this time.  Patient does have BPH and is on tamsulosin he should continue this.  He will follow-up with Dr. Bowen in approximately 1 week to have the catheter removed and to have a consultation regarding medical and potential surgical options in the future if this becomes recurrent.        Diagnosis:    ICD-10-CM    1. Urinary retention  R33.9                Jason Boucher MD  4/14/2024   Jason Boucher MD Rock, Michael P, MD  04/14/24 0815

## 2024-04-14 NOTE — TELEPHONE ENCOUNTER
Patient was in yesterday at ED and catheter was placed.  Last night woke up flush and with a fever.   Patient is not able to empty the bladder.  Patient does not have a catheter in place now.  Patient is retaining urine.  Patient will return to ER.      Reason for Disposition   [1] Unable to urinate (or only a few drops) > 4 hours AND [2] bladder feels very full (e.g., palpable bladder or strong urge to urinate)    Additional Information   Negative: Shock suspected (e.g., cold/pale/clammy skin, too weak to stand, low BP, rapid pulse)   Negative: Sounds like a life-threatening emergency to the triager    Protocols used: Urinary Symptoms-A-AH

## 2024-04-14 NOTE — ED NOTES
Teaching provided to patient and spouse about leg bag/night acg, aseptic technique, garcia cares. Additional UpToDate handout provided in addition to AVS. Questions answered. Patient/spouse verbalize understanding.

## 2024-04-14 NOTE — DISCHARGE INSTRUCTIONS
Keep the catheter in for a week.  Make an appointment to see the urologist Dr. Bowen for consultation and catheter removal.

## 2024-04-14 NOTE — ED TRIAGE NOTES
Here yesterday for urinary retention, had garcia but was removed prior to discharging. Overnight woke up with discomfort and night sweats. Unable to urinate completely this morning.

## 2024-04-15 ENCOUNTER — TELEPHONE (OUTPATIENT)
Dept: UROLOGY | Facility: CLINIC | Age: 49
End: 2024-04-15
Payer: COMMERCIAL

## 2024-04-15 LAB — BACTERIA UR CULT: NO GROWTH

## 2024-04-15 NOTE — TELEPHONE ENCOUNTER
----- Message from Hamzah Bowen MD sent at 4/15/2024 11:02 AM CDT -----  Yes he should see the CHRISTINE or nurse in 1 week for cath out and TOV thanks  ----- Message -----  From: Kailyn Perla  Sent: 4/15/2024   8:52 AM CDT  To: Hamzah Bowen MD    Hey this Pt of your was in the ED this weekend in retention and now has a cath he was told he needed to keep it in a week and then see you to remove. You or the APPs do not have anything in a week time frame. What would you like to do?    Please Advise  Kailyn  
Called Pt back after Andrés replied to my note on how to continue care. I scheduled him for cath out on 4/22 but he wanted to also be scheduled to do a VV to talk next steps with Dr. Bowen so I put him in on 4/26/24.    Caitlin  
cbc 6.6/20

## 2024-04-15 NOTE — TELEPHONE ENCOUNTER
Patient called nurse line and LM. Returned phone call and spoke with patient. He is concerned about lower abdominal discomfort. Explained to patient that this is likely from bladder spasms. Patient is aware that there is medication for this that MD could send in, However, did explain to patient that they may cause retention. Since he is coming in next week for trial of void, recommended that patient hold off on requesting this medication. Also, patient's appointment was moved to earlier appointment in case patient doesn't pass. Patient requested an earlier time slot. Scheduling was made aware of this change and moved appointment. Patient was also advised to keep upcoming appointment with MD to discuss next options if he doesn't pass TOV. Patient expressed understanding.       Jacquelyn Isabel LPN

## 2024-04-19 ENCOUNTER — TELEPHONE (OUTPATIENT)
Dept: UROLOGY | Facility: CLINIC | Age: 49
End: 2024-04-19
Payer: COMMERCIAL

## 2024-04-19 NOTE — TELEPHONE ENCOUNTER
Patent called stating that he has a fever of 100.5 and his bladder sx's are worse. He will go to UC to  be evaluated.  Brooke Shankar LPN

## 2024-04-23 ENCOUNTER — VIRTUAL VISIT (OUTPATIENT)
Dept: UROLOGY | Facility: CLINIC | Age: 49
End: 2024-04-23
Payer: COMMERCIAL

## 2024-04-23 VITALS — DIASTOLIC BLOOD PRESSURE: 80 MMHG | BODY MASS INDEX: 26.61 KG/M2 | SYSTOLIC BLOOD PRESSURE: 120 MMHG | WEIGHT: 175 LBS

## 2024-04-23 DIAGNOSIS — R33.9 URINARY RETENTION: ICD-10-CM

## 2024-04-23 DIAGNOSIS — N40.0 ENLARGED PROSTATE: ICD-10-CM

## 2024-04-23 DIAGNOSIS — R97.20 ELEVATED PROSTATE SPECIFIC ANTIGEN (PSA): Primary | ICD-10-CM

## 2024-04-23 PROCEDURE — 99214 OFFICE O/P EST MOD 30 MIN: CPT | Mod: 95 | Performed by: UROLOGY

## 2024-04-23 RX ORDER — ERTAPENEM 1 G/1
1 INJECTION, POWDER, LYOPHILIZED, FOR SOLUTION INTRAMUSCULAR; INTRAVENOUS EVERY 24 HOURS
COMMUNITY
Start: 2024-04-23 | End: 2024-05-11

## 2024-04-23 RX ORDER — BISMUTH SUBSALICYLATE 262MG/15ML
30 SUSPENSION, ORAL (FINAL DOSE FORM) ORAL
COMMUNITY
Start: 2024-04-22 | End: 2024-06-28

## 2024-04-23 RX ORDER — MIRABEGRON 25 MG/1
1 TABLET, FILM COATED, EXTENDED RELEASE ORAL DAILY
COMMUNITY
Start: 2024-04-23 | End: 2024-06-28

## 2024-04-23 ASSESSMENT — PAIN SCALES - GENERAL: PAINLEVEL: WORST PAIN (10)

## 2024-04-23 NOTE — LETTER
4/23/2024       RE: Andre Rose  [address] [address]     Dear Colleague,    Thank you for referring your patient, Andre Rose, to the [facility] at [facility]. Please see a copy of my visit note below.    Office Visit Note  [facility]  [phone]    UROLOGIC DIAGNOSES:   Enlarged prostate  Elevated PSA  Urinary retention    CURRENT INTERVENTIONS:   MRI prostate x 2  Negative prostate biopsy x 2  Flomax  Velazquez catheter    HISTORY:   Andre steve developed urinary retention recently.  He was straight catheterized in the emergency department at [facility] on April 13 and was sent home.  He was unable to void again and he came back quickly the next day and was catheterized again.  He was then later diagnosed with a urinary tract infection and he was admitted at [facility] until yesterday.  He says that he is currently on a 3-week course of IV antibiotics at home because he had a resistant organism.  He had been taking Flomax this whole time.  He is starting to feel better again but is still recovering from his infection and hospitalization.  He wanted to discuss surgical treatment options with me today.  In particular he had questions about the Rezum procedure and Aquablation, both procedures he had been reading about.      PAST MEDICAL HISTORY:   Past Medical History:   Diagnosis Date    Anal fistula     Blood in semen     Epididymitis, bilateral     Former smoker quit 3/10    16 pack year history    Headaches     Hematospermia     Orchitis, epididymitis, and epididymo-orchitis, with abscess     Overweight (BMI 25.0-29.9)     Penile discharge     Premature ejaculation 10/10/2007    Katy        PAST SURGICAL HISTORY:   Past Surgical History:   Procedure Laterality Date    ADENOIDECTOMY      EXAM UNDER ANESTHESIA RECTUM  8/25/2012    Procedure: EXAM UNDER ANESTHESIA  RECTUM;  EXAM UNDER ANESTHESIA, NO EVIDENCE OF FISTULA FOUND;  Surgeon: Goldberg, Stanley Morton, MD;  Location: [facility]    FISTULOTOMY RECTUM  12/10/2011    Procedure:FISTULOTOMY RECTUM; Surgeon:GOLDBERG, STANLEY MORTON; Location:[facility]    FISTULOTOMY RECTUM  2/9/2013    Procedure: FISTULOTOMY RECTUM;  FISTULOTOMY;  Surgeon: Goldberg, Stanley Morton, MD;  Location: [facility]    REMV PILONIDAL LESION SIMPLE      ZZHC COLONOSCOPY THRU STOMA, DIAGNOSTIC  2009    repeat age 50       FAMILY HISTORY:   Family History   Problem Relation Age of Onset    Hypertension Mother     Lung Cancer Mother     Prostate Cancer Maternal Uncle         ~52    Prostate Cancer Paternal Grandfather     Alzheimer Disease Maternal Grandfather     Cancer - colorectal No family hx of     Gastrointestinal Disease No family hx of     Diabetes No family hx of     C.A.D. No family hx of     Asthma No family hx of     Cerebrovascular Disease No family hx of        SOCIAL HISTORY:   Social History     Tobacco Use    Smoking status: Every Day     Current packs/day: 0.50     Average packs/day: 0.5 packs/day for 20.0 years (10.0 ttl pk-yrs)     Types: Cigarettes    Smokeless tobacco: Never   Substance Use Topics    Alcohol use: Yes       REVIEW OF SYSTEMS:  Skin: No rash, pruritis, or skin pigmentation  Eyes: No changes in vision  Ears/Nose/Throat: No changes in hearing, no nosebleeds  Respiratory: No shortness of breath, dyspnea on exertion, cough, or hemoptysis  Cardiovascular: No chest pain or palpitations  Gastrointestinal: No diarrhea or constipation. No abdominal pain. No hematochezia  Genitourinary: see HPI  Musculoskeletal: No pain or swelling of joints, normal range of motion  Neurologic: No weakness or tremors  Psychiatric: No recent changes in memory or mood  Hematologic/Lymphatic/Immunologic: No easy bruising or enlarged lymph nodes  Endocrine: No weight gain or loss      PHYSICAL EXAM:    General: Alert and oriented to time, place, and self. In  NAD   HEENT: Head AT/NC, EOMI, CN Grossly intact   Lungs: no respiratory distress, or pursed lip breathing   Heart: No obvious jugular venous distension present   Musculoskeltal: Normal movements. Normal appearing musculature  Skin: no suspicious lesions or rashes   Neuro: Alert, oriented, speech and mentation normal; moving all 4 extremities equally.   Psych: affect and mood normal    Imaging: None    Urinalysis: UA RESULTS:  Recent Labs   Lab Test 04/14/24  0741 04/13/24  1413 06/01/23  1146   COLOR Light Yellow   < > Yellow   APPEARANCE Clear   < > Clear   URINEGLC Negative   < > Negative   URINEBILI Negative   < > Negative   URINEKETONE 40*   < > Negative   SG 1.022   < > 1.025   UBLD Small*   < > Trace*   URINEPH 5.5   < > 6.5   PROTEIN Negative   < > Negative   UROBILINOGEN  --   --  0.2   NITRITE Negative   < > Negative   LEUKEST Negative   < > Negative   RBCU 7*   < >  --    WBCU 1   < >  --     < > = values in this interval not displayed.       PSA: 7.2    Post Void Residual:     Other labs: None today      IMPRESSION:  Urinary retention  Enlarged prostate  Elevated PSA    PLAN:  He unfortunately is in urinary retention, even while taking the Flomax.  We did discuss other medical treatment options including adding finasteride to his Flomax.  However, I counseled him that given his enlarged prostate, his young age, and his multiple episodes of retention I do feel it is very likely he will require a surgical procedure and he agrees.    We also again discussed his elevated PSA.  I do not feel that he has prostate cancer since he has had 2 negative biopsies as well as MRIs and the most recent PSA was stable.  At this time I do not think he requires another evaluation for potential prostate cancer before proceeding with surgery but we did have this discussion.    We discussed surgical treatment options including Rezum procedure, photo vaporization of the prostate, transurethral resection of the prostate, aqua  ablation, HoLEP, and robotic simple prostatectomy.    In particular I answered his questions about Rezum procedure.  He would be a reasonable candidate for this procedure.  However, I counseled him that the procedure does not have an immediate effect and therefore he would likely require a more prolonged catheterization after the procedure.  Furthermore, he is a very young age and I would not worry about the durability of the Rezum for him.    After discussing treatment options I feel he is best suited to undergo either a TURP or aquablation of the prostate.  We discussed the 2 procedures as well as their similarities and differences and risks.  He is quite interested in the Aquablation procedure and he has been reading about it already.  I informed him that this technology is not yet available at [place] and he has an appointment with [facility] tomorrow to discuss the procedure already.  He will likely proceed with Aquabation through [facility] and he is welcome to follow-up with me as needed in the future.      Hamzah Bowen M.D.              Virtual Visit Details    Type of service:  Video Visit     Originating Location (pt. Location): Home    Distant Location (provider location):  On-site  Platform used for Video Visit: Glenda

## 2024-04-23 NOTE — PROGRESS NOTES
Office Visit Note  [facility]  [phone]    UROLOGIC DIAGNOSES:   Enlarged prostate  Elevated PSA  Urinary retention    CURRENT INTERVENTIONS:   MRI prostate x 2  Negative prostate biopsy x 2  Flomax  Velazquez catheter    HISTORY:   Andre unfortunately developed urinary retention recently.  He was straight catheterized in the emergency department at [facility] on April 13 and was sent home.  He was unable to void again and he came back quickly the next day and was catheterized again.  He was then later diagnosed with a urinary tract infection and he was admitted at [facility] until yesterday.  He says that he is currently on a 3-week course of IV antibiotics at home because he had a resistant organism.  He had been taking Flomax this whole time.  He is starting to feel better again but is still recovering from his infection and hospitalization.  He wanted to discuss surgical treatment options with me today.  In particular he had questions about the Rezum procedure and Aquablation, both procedures he had been reading about.      PAST MEDICAL HISTORY:   Past Medical History:   Diagnosis Date    Anal fistula     Blood in semen     Epididymitis, bilateral     Former smoker quit 3/10    16 pack year history    Headaches     Hematospermia     Orchitis, epididymitis, and epididymo-orchitis, with abscess     Overweight (BMI 25.0-29.9)     Penile discharge     Premature ejaculation 10/10/2007    Snores        PAST SURGICAL HISTORY:   Past Surgical History:   Procedure Laterality Date    ADENOIDECTOMY      EXAM UNDER ANESTHESIA RECTUM  8/25/2012    Procedure: EXAM UNDER ANESTHESIA RECTUM;  EXAM UNDER ANESTHESIA, NO EVIDENCE OF FISTULA FOUND;  Surgeon: Goldberg, Stanley Morton, MD;  Location: [facility]    FISTULOTOMY RECTUM  12/10/2011    Procedure:FISTULOTOMY RECTUM; Surgeon:GOLDBERG, STANLEY MORTON; Location:[facility]    FISTULOTOMY RECTUM  2/9/2013    Procedure: FISTULOTOMY RECTUM;   FISTULOTOMY;  Surgeon: Goldberg, Stanley Morton, MD;  Location: [facility]    REMV PILONIDAL LESION SIMPLE      ZZHC COLONOSCOPY THRU STOMA, DIAGNOSTIC  2009    repeat age 50       FAMILY HISTORY:   Family History   Problem Relation Age of Onset    Hypertension Mother     Lung Cancer Mother     Prostate Cancer Maternal Uncle         ~52    Prostate Cancer Paternal Grandfather     Alzheimer Disease Maternal Grandfather     Cancer - colorectal No family hx of     Gastrointestinal Disease No family hx of     Diabetes No family hx of     C.A.D. No family hx of     Asthma No family hx of     Cerebrovascular Disease No family hx of        SOCIAL HISTORY:   Social History     Tobacco Use    Smoking status: Every Day     Current packs/day: 0.50     Average packs/day: 0.5 packs/day for 20.0 years (10.0 ttl pk-yrs)     Types: Cigarettes    Smokeless tobacco: Never   Substance Use Topics    Alcohol use: Yes       REVIEW OF SYSTEMS:  Skin: No rash, pruritis, or skin pigmentation  Eyes: No changes in vision  Ears/Nose/Throat: No changes in hearing, no nosebleeds  Respiratory: No shortness of breath, dyspnea on exertion, cough, or hemoptysis  Cardiovascular: No chest pain or palpitations  Gastrointestinal: No diarrhea or constipation. No abdominal pain. No hematochezia  Genitourinary: see HPI  Musculoskeletal: No pain or swelling of joints, normal range of motion  Neurologic: No weakness or tremors  Psychiatric: No recent changes in memory or mood  Hematologic/Lymphatic/Immunologic: No easy bruising or enlarged lymph nodes  Endocrine: No weight gain or loss      PHYSICAL EXAM:    General: Alert and oriented to time, place, and self. In NAD   HEENT: Head AT/NC, EOMI, CN Grossly intact   Lungs: no respiratory distress, or pursed lip breathing   Heart: No obvious jugular venous distension present   Musculoskeltal: Normal movements. Normal appearing musculature  Skin: no suspicious lesions or rashes   Neuro: Alert, oriented, speech and  mentation normal; moving all 4 extremities equally.   Psych: affect and mood normal    Imaging: None    Urinalysis: UA RESULTS:  Recent Labs   Lab Test 04/14/24  0741 04/13/24  1413 06/01/23  1146   COLOR Light Yellow   < > Yellow   APPEARANCE Clear   < > Clear   URINEGLC Negative   < > Negative   URINEBILI Negative   < > Negative   URINEKETONE 40*   < > Negative   SG 1.022   < > 1.025   UBLD Small*   < > Trace*   URINEPH 5.5   < > 6.5   PROTEIN Negative   < > Negative   UROBILINOGEN  --   --  0.2   NITRITE Negative   < > Negative   LEUKEST Negative   < > Negative   RBCU 7*   < >  --    WBCU 1   < >  --     < > = values in this interval not displayed.       PSA: 7.2    Post Void Residual:     Other labs: None today      IMPRESSION:  Urinary retention  Enlarged prostate  Elevated PSA    PLAN:  He unfortunately is in urinary retention, even while taking the Flomax.  We did discuss other medical treatment options including adding finasteride to his Flomax.  However, I counseled him that given his enlarged prostate, his young age, and his multiple episodes of retention I do feel it is very likely he will require a surgical procedure and he agrees.    We also again discussed his elevated PSA.  I do not feel that he has prostate cancer since he has had 2 negative biopsies as well as MRIs and the most recent PSA was stable.  At this time I do not think he requires another evaluation for potential prostate cancer before proceeding with surgery but we did have this discussion.    We discussed surgical treatment options including Rezum procedure, photo vaporization of the prostate, transurethral resection of the prostate, aqua ablation, HoLEP, and robotic simple prostatectomy.    In particular I answered his questions about Rezum procedure.  He would be a reasonable candidate for this procedure.  However, I counseled him that the procedure does not have an immediate effect and therefore he would likely require a more  prolonged catheterization after the procedure.  Furthermore, he is a very young age and I would not worry about the durability of the Rezum for him.    After discussing treatment options I feel he is best suited to undergo either a TURP or aquablation of the prostate.  We discussed the 2 procedures as well as their similarities and differences and risks.  He is quite interested in the Aquablation procedure and he has been reading about it already.  I informed him that this technology is not yet available at [place] and he has an appointment with [facility] tomorrow to discuss the procedure already.  He will likely proceed with Aquabation through [facility] and he is welcome to follow-up with me as needed in the future.      Hamzah Bowen M.D.              Virtual Visit Details    Type of service:  Video Visit     Originating Location (pt. Location): Home    Distant Location (provider location):  On-site  Platform used for Video Visit: Glenda

## 2024-04-23 NOTE — NURSING NOTE
Is the patient currently in the state of [place]? YES    Visit mode:VIDEO    If the visit is dropped, the patient can be reconnected by: VIDEO VISIT: Text to cell phone:   Telephone Information:   Mobile [phone]       Will anyone else be joining the visit? No  (If patient encounters technical issues they should call [phone])    How would you like to obtain your AVS? MyChart    Are changes needed to the allergy or medication list? Yes pt flagged meds for removal    Are refills needed on medications prescribed by this physician? NO    Rooming Documentation: Assigned questionnaire(s) completed .    Reason for visit: RECHECK     ADITYA Rollins

## 2024-04-24 ENCOUNTER — TRANSFERRED RECORDS (OUTPATIENT)
Dept: HEALTH INFORMATION MANAGEMENT | Facility: CLINIC | Age: 49
End: 2024-04-24
Payer: COMMERCIAL

## 2024-05-16 ENCOUNTER — TRANSFERRED RECORDS (OUTPATIENT)
Dept: HEALTH INFORMATION MANAGEMENT | Facility: CLINIC | Age: 49
End: 2024-05-16
Payer: COMMERCIAL

## 2024-06-15 ENCOUNTER — HEALTH MAINTENANCE LETTER (OUTPATIENT)
Age: 49
End: 2024-06-15

## 2024-06-28 ENCOUNTER — OFFICE VISIT (OUTPATIENT)
Dept: FAMILY MEDICINE | Facility: CLINIC | Age: 49
End: 2024-06-28
Payer: COMMERCIAL

## 2024-06-28 VITALS
DIASTOLIC BLOOD PRESSURE: 82 MMHG | HEIGHT: 69 IN | BODY MASS INDEX: 25.21 KG/M2 | SYSTOLIC BLOOD PRESSURE: 116 MMHG | RESPIRATION RATE: 18 BRPM | HEART RATE: 71 BPM | OXYGEN SATURATION: 100 % | WEIGHT: 170.2 LBS | TEMPERATURE: 97.2 F

## 2024-06-28 DIAGNOSIS — R39.11 BENIGN PROSTATIC HYPERPLASIA WITH URINARY HESITANCY: ICD-10-CM

## 2024-06-28 DIAGNOSIS — N40.1 BENIGN PROSTATIC HYPERPLASIA WITH URINARY HESITANCY: ICD-10-CM

## 2024-06-28 DIAGNOSIS — Z12.11 SCREEN FOR COLON CANCER: Primary | ICD-10-CM

## 2024-06-28 DIAGNOSIS — Z01.818 PRE-OP EXAM: ICD-10-CM

## 2024-06-28 LAB
ERYTHROCYTE [DISTWIDTH] IN BLOOD BY AUTOMATED COUNT: 12.7 % (ref 10–15)
HCT VFR BLD AUTO: 43.7 % (ref 40–53)
HGB BLD-MCNC: 15.2 G/DL (ref 13.3–17.7)
MCH RBC QN AUTO: 31.9 PG (ref 26.5–33)
MCHC RBC AUTO-ENTMCNC: 34.8 G/DL (ref 31.5–36.5)
MCV RBC AUTO: 92 FL (ref 78–100)
PLATELET # BLD AUTO: 199 10E3/UL (ref 150–450)
RBC # BLD AUTO: 4.77 10E6/UL (ref 4.4–5.9)
WBC # BLD AUTO: 7.2 10E3/UL (ref 4–11)

## 2024-06-28 PROCEDURE — 80053 COMPREHEN METABOLIC PANEL: CPT | Performed by: FAMILY MEDICINE

## 2024-06-28 PROCEDURE — 85027 COMPLETE CBC AUTOMATED: CPT | Performed by: FAMILY MEDICINE

## 2024-06-28 PROCEDURE — 36415 COLL VENOUS BLD VENIPUNCTURE: CPT | Performed by: FAMILY MEDICINE

## 2024-06-28 PROCEDURE — 99214 OFFICE O/P EST MOD 30 MIN: CPT | Performed by: FAMILY MEDICINE

## 2024-06-28 RX ORDER — FINASTERIDE 5 MG/1
1 TABLET, FILM COATED ORAL DAILY
COMMUNITY

## 2024-06-28 ASSESSMENT — PAIN SCALES - GENERAL: PAINLEVEL: NO PAIN (0)

## 2024-06-28 NOTE — PROGRESS NOTES
Preoperative Evaluation  [address] [address]  Phone: [phone]  Primary Provider: Conor Corea MD  Pre-op Performing Provider: Conor Corea MD  Jun 28, 2024 6/27/2024   Surgical Information   What procedure is being done? Aquablation   Facility or Hospital where procedure/surgery will be performed: [facility]   Who is doing the procedure / surgery? Dr. Carrizales   Date of surgery / procedure: July 15   Time of surgery / procedure: 8am   Where do you plan to recover after surgery? at home with family        Fax number for surgical facility: [phone]    Assessment & Plan     The proposed surgical procedure is considered LOW risk.    Screen for colon cancer    - Colonoscopy Screening  Referral; Future    Pre-op exam    - Comprehensive metabolic panel; Future  - CBC with Platelets; Future  - Comprehensive metabolic panel  - CBC with Platelets    Benign prostatic hyperplasia with urinary hesitancy    - Comprehensive metabolic panel; Future  - CBC with Platelets; Future  - Comprehensive metabolic panel  - CBC with Platelets            - No identified additional risk factors other than previously addressed    Antiplatelet or Anticoagulation Medication Instructions   - Patient is on no antiplatelet or anticoagulation medications.    Additional Medication Instructions  Take all scheduled medications on the day of surgery    Recommendation  Approval given to proceed with proposed procedure, without further diagnostic evaluation.    Deisy Powell is a 48 year old, presenting for the following:  Pre-Op Exam (Fasting.)          6/28/2024     8:44 AM   Additional Questions   Roomed by Jose BUENO related to upcoming procedure:         6/27/2024   Pre-Op Questionnaire   Have you ever had a heart attack or stroke? No   Have you ever had surgery on your heart or blood vessels, such as a stent placement, a coronary artery bypass,  or surgery on an artery in your head, neck, heart, or legs? No   Do you have chest pain with activity? No   Do you have a history of heart failure? No   Do you currently have a cold, bronchitis or symptoms of other infection? No   Do you have a cough, shortness of breath, or wheezing? No   Do you or anyone in your family have previous history of blood clots? (!) YES    Do you or does anyone in your family have a serious bleeding problem such as prolonged bleeding following surgeries or cuts? No   Have you ever had problems with anemia or been told to take iron pills? No   Have you had any abnormal blood loss such as black, tarry or bloody stools? No   Have you ever had a blood transfusion? No   Are you willing to have a blood transfusion if it is medically needed before, during, or after your surgery? (!) NO    Have you or any of your relatives ever had problems with anesthesia? No   Do you have sleep apnea, excessive snoring or daytime drowsiness? No   Do you have any artifical heart valves or other implanted medical devices like a pacemaker, defibrillator, or continuous glucose monitor? No   Do you have artificial joints? No   Are you allergic to latex? No        Health Care Directive  Patient does not have a Health Care Directive or Living Will:     Preoperative Review of   No issues}        Patient Active Problem List    Diagnosis Date Noted    Rectal fistula 11/12/2011     Priority: High    Anal fissure 10/25/2013     Priority: Medium    Hematospermia 08/22/2012     Priority: Medium    Overweight (BMI 25.0-29.9) 12/06/2011     Priority: Medium    CARDIOVASCULAR SCREENING; LDL GOAL LESS THAN 160 10/31/2010     Priority: Medium    Premature ejaculation 10/10/2007     Priority: Medium    Internal hemorrhoids 12/20/2006     Priority: Low     Problem list name updated by automated process. Provider to review        Past Medical History:   Diagnosis Date    Anal fistula     Blood in semen     Epididymitis,  bilateral     Former smoker quit 3/10    16 pack year history    Headaches     Hematospermia     Orchitis, epididymitis, and epididymo-orchitis, with abscess     Overweight (BMI 25.0-29.9)     Penile discharge     Premature ejaculation 10/10/2007    Snores      Past Surgical History:   Procedure Laterality Date    ADENOIDECTOMY      EXAM UNDER ANESTHESIA RECTUM  08/25/2012    Procedure: EXAM UNDER ANESTHESIA RECTUM;  EXAM UNDER ANESTHESIA, NO EVIDENCE OF FISTULA FOUND;  Surgeon: Goldberg, Stanley Morton, MD;  Location: [facility]    EYE SURGERY  2016    Not surgery but repeat hole in  macula that sealed over time    FISTULOTOMY RECTUM  12/10/2011    Procedure:FISTULOTOMY RECTUM; Surgeon:GOLDBERG, STANLEY MORTON; Location:[facility]    FISTULOTOMY RECTUM  02/09/2013    Procedure: FISTULOTOMY RECTUM;  FISTULOTOMY;  Surgeon: Goldberg, Stanley Morton, MD;  Location: [facility]    REMV PILONIDAL LESION SIMPLE      ZZHC COLONOSCOPY THRU STOMA, DIAGNOSTIC  2009    repeat age 50     Current Outpatient Medications   Medication Sig Dispense Refill    finasteride (PROSCAR) 5 MG tablet Take 1 tablet by mouth daily      tamsulosin (FLOMAX) 0.4 MG capsule Take 1 capsule (0.4 mg) by mouth daily 90 capsule 3    aspirin 81 MG EC tablet Take 81 mg by mouth daily (Patient not taking: Reported on 4/19/2022)      bismuth subsalicylate (PEPTO-BISMOL) 262 MG/15ML suspension Take 30 mLs by mouth (Patient not taking: Reported on 6/28/2024)      mirabegron (MYRBETRIQ) 25 MG 24 hr tablet Take 1 tablet by mouth daily (Patient not taking: Reported on 6/28/2024)         Allergies   Allergen Reactions    Penicillins Swelling    Seasonal Allergies Other (See Comments)     Runny eyes and nose.          Social History     Tobacco Use    Smoking status: Every Day     Current packs/day: 0.50     Average packs/day: 0.5 packs/day for 20.0 years (10.0 ttl pk-yrs)     Types: Cigarettes    Smokeless tobacco: Never    Tobacco comments:     off and on for many years...  "  Substance Use Topics    Alcohol use: Yes     History   Drug Use No             Review of Systems  CONSTITUTIONAL: NEGATIVE for fever, chills, change in weight  INTEGUMENTARY/SKIN: NEGATIVE for worrisome rashes, moles or lesions  EYES: NEGATIVE for vision changes or irritation  ENT/MOUTH: NEGATIVE for ear, mouth and throat problems  RESP: NEGATIVE for significant cough or SOB  BREAST: NEGATIVE for masses, tenderness or discharge  CV: NEGATIVE for chest pain, palpitations or peripheral edema  GI: NEGATIVE for nausea, abdominal pain, heartburn, or change in bowel habits  : NEGATIVE for frequency, dysuria, or hematuria  MUSCULOSKELETAL: NEGATIVE for significant arthralgias or myalgia  NEURO: NEGATIVE for weakness, dizziness or paresthesias  ENDOCRINE: NEGATIVE for temperature intolerance, skin/hair changes  HEME: NEGATIVE for bleeding problems  PSYCHIATRIC: NEGATIVE for changes in mood or affect    Objective    /82   Pulse 71   Temp 97.2  F (36.2  C) (Temporal)   Resp 18   Ht 1.753 m (5' 9\")   Wt 77.2 kg (170 lb 3.2 oz)   SpO2 100%   BMI 25.13 kg/m     Estimated body mass index is 25.13 kg/m  as calculated from the following:    Height as of this encounter: 1.753 m (5' 9\").    Weight as of this encounter: 77.2 kg (170 lb 3.2 oz).  Physical Exam  GENERAL: alert and no distress  EYES: Eyes grossly normal to inspection, PERRL and conjunctivae and sclerae normal  HENT: ear canals and TM's normal, nose and mouth without ulcers or lesions  NECK: no adenopathy, no asymmetry, masses, or scars  RESP: lungs clear to auscultation - no rales, rhonchi or wheezes  CV: regular rate and rhythm, normal S1 S2, no S3 or S4, no murmur, click or rub, no peripheral edema  ABDOMEN: soft, nontender, no hepatosplenomegaly, no masses and bowel sounds normal  MS: no gross musculoskeletal defects noted, no edema  SKIN: no suspicious lesions or rashes  NEURO: Normal strength and tone, mentation intact and speech normal  PSYCH: " "mentation appears normal, affect normal/bright    No results for input(s): \"HGB\", \"PLT\", \"INR\", \"NA\", \"POTASSIUM\", \"CR\", \"A1C\" in the last 8760 hours.     Diagnostics  Labs pending at this time.  Results will be reviewed when available.   No EKG required, no history of coronary heart disease, significant arrhythmia, peripheral arterial disease or other structural heart disease.    Revised Cardiac Risk Index (RCRI)  The patient has the following serious cardiovascular risks for perioperative complications:   - No serious cardiac risks = 0 points     RCRI Interpretation: 0 points: Class I (very low risk - 0.4% complication rate)         Signed Electronically by: Conor Corea MD  Copy of this evaluation report is provided to requesting physician.  \  "

## 2024-06-29 LAB
ALBUMIN SERPL BCG-MCNC: 4.4 G/DL (ref 3.5–5.2)
ALP SERPL-CCNC: 67 U/L (ref 40–150)
ALT SERPL W P-5'-P-CCNC: 18 U/L (ref 0–70)
ANION GAP SERPL CALCULATED.3IONS-SCNC: 11 MMOL/L (ref 7–15)
AST SERPL W P-5'-P-CCNC: 16 U/L (ref 0–45)
BILIRUB SERPL-MCNC: 0.6 MG/DL
BUN SERPL-MCNC: 14 MG/DL (ref 6–20)
CALCIUM SERPL-MCNC: 9.7 MG/DL (ref 8.6–10)
CHLORIDE SERPL-SCNC: 104 MMOL/L (ref 98–107)
CREAT SERPL-MCNC: 1.08 MG/DL (ref 0.67–1.17)
DEPRECATED HCO3 PLAS-SCNC: 25 MMOL/L (ref 22–29)
EGFRCR SERPLBLD CKD-EPI 2021: 85 ML/MIN/1.73M2
GLUCOSE SERPL-MCNC: 91 MG/DL (ref 70–99)
POTASSIUM SERPL-SCNC: 4.6 MMOL/L (ref 3.4–5.3)
PROT SERPL-MCNC: 6.9 G/DL (ref 6.4–8.3)
SODIUM SERPL-SCNC: 140 MMOL/L (ref 135–145)

## 2024-07-10 NOTE — TELEPHONE ENCOUNTER
Case in [facility] for call back to reschedule, MyChart message sent.

## 2024-10-17 ENCOUNTER — OFFICE VISIT (OUTPATIENT)
Dept: FAMILY MEDICINE | Facility: CLINIC | Age: 49
End: 2024-10-17
Payer: COMMERCIAL

## 2024-10-17 VITALS
DIASTOLIC BLOOD PRESSURE: 82 MMHG | HEIGHT: 69 IN | SYSTOLIC BLOOD PRESSURE: 113 MMHG | RESPIRATION RATE: 20 BRPM | WEIGHT: 175.6 LBS | HEART RATE: 84 BPM | OXYGEN SATURATION: 98 % | BODY MASS INDEX: 26.01 KG/M2 | TEMPERATURE: 97.5 F

## 2024-10-17 DIAGNOSIS — Z01.818 PREOP GENERAL PHYSICAL EXAM: Primary | ICD-10-CM

## 2024-10-17 DIAGNOSIS — N40.1 LOWER URINARY TRACT SYMPTOMS DUE TO BENIGN PROSTATIC HYPERPLASIA: ICD-10-CM

## 2024-10-17 LAB
ERYTHROCYTE [DISTWIDTH] IN BLOOD BY AUTOMATED COUNT: 12.7 % (ref 10–15)
HCT VFR BLD AUTO: 44.1 % (ref 40–53)
HGB BLD-MCNC: 15.4 G/DL (ref 13.3–17.7)
MCH RBC QN AUTO: 31.7 PG (ref 26.5–33)
MCHC RBC AUTO-ENTMCNC: 34.9 G/DL (ref 31.5–36.5)
MCV RBC AUTO: 91 FL (ref 78–100)
PLATELET # BLD AUTO: 206 10E3/UL (ref 150–450)
RBC # BLD AUTO: 4.86 10E6/UL (ref 4.4–5.9)
WBC # BLD AUTO: 10.2 10E3/UL (ref 4–11)

## 2024-10-17 PROCEDURE — 99214 OFFICE O/P EST MOD 30 MIN: CPT | Performed by: PHYSICIAN ASSISTANT

## 2024-10-17 PROCEDURE — 85027 COMPLETE CBC AUTOMATED: CPT | Performed by: PHYSICIAN ASSISTANT

## 2024-10-17 PROCEDURE — 36415 COLL VENOUS BLD VENIPUNCTURE: CPT | Performed by: PHYSICIAN ASSISTANT

## 2024-10-17 PROCEDURE — 80048 BASIC METABOLIC PNL TOTAL CA: CPT | Performed by: PHYSICIAN ASSISTANT

## 2024-10-17 ASSESSMENT — PAIN SCALES - GENERAL: PAINLEVEL: NO PAIN (0)

## 2024-10-17 NOTE — PATIENT INSTRUCTIONS
Patient Education   Preparing for Your Surgery  For Adults  Getting started  In most cases, a nurse will call to review your health history and instructions. They will give you an arrival time based on your scheduled surgery time. Please be ready to share:  Your doctor's clinic name and phone number  Your medical, surgical, and anesthesia history  A list of allergies and sensitivities  A list of medicines, including herbal treatments and over-the-counter drugs  Whether the patient has a legal guardian (ask how to send us the papers in advance)  Note: You may not receive a call if you were seen at our PAC (Preoperative Assessment Center).  Please tell us if you're pregnant--or if there's any chance you might be pregnant. Some surgeries may injure a fetus (unborn baby), so they require a pregnancy test. Surgeries that are safe for a fetus don't always need a test, and you can choose whether to have one.   Preparing for surgery  Within 10 to 30 days of surgery: Have a pre-op exam (sometimes called an H&P, or History and Physical). This can be done at a clinic or pre-operative center.  If you're having a , you may not need this exam. Talk to your care team.  At your pre-op exam, talk to your care team about all medicines you take. (This includes CBD oil and any drugs, such as THC, marijuana, and other forms of cannabis.) If you need to stop any medicine before surgery, ask when to start taking it again.  This is for your safety. Many medicines and drugs can make you bleed too much during surgery. Some change how well surgery (anesthesia) drugs work.  Call your insurance company to let them know you're having surgery. (If you don't have insurance, call [phone].)  Call your clinic if there's any change in your health. This includes a scrape or scratch near the surgery site, or any signs of a cold (sore throat, runny nose, cough, rash, fever).  Eating and drinking guidelines  For your safety: Unless  your surgeon tells you otherwise, follow the guidelines below.  Eat and drink as normal until 8 hours before you arrive for surgery. After that, no food or milk. You can spit out gum when you arrive.  Drink clear liquids until 2 hours before you arrive. These are liquids you can see through, like water, Gatorade, and Propel Water. They also include plain black coffee and tea (no cream or milk).  No alcohol for 24 hours before you arrive. The night before surgery, stop any drinks that contain THC.  If your care team tells you to take medicine on the morning of surgery, it's okay to take it with a sip of water. No other medicines or drugs are allowed (including CBD oil)--follow your care team's instructions.  If you have questions the day of surgery, call your hospital or surgery center.   Preventing infection  Shower or bathe the night before and the morning of surgery. Follow the instructions your clinic gave you. (If no instructions, use regular soap.)  Don't shave or clip hair near your surgery site. We'll remove the hair if needed.  Don't smoke or vape the morning of surgery. No chewing tobacco for 6 hours before you arrive. A nicotine patch is okay. You may spit out nicotine gum when you arrive.  For some surgeries, the surgeon will tell you to fully quit smoking and nicotine.  We will make every effort to keep you safe from infection. We will:  Clean our hands often with soap and water (or an alcohol-based hand rub).  Clean the skin at your surgery site with a special soap that kills germs.  Give you a special gown to keep you warm. (Cold raises the risk of infection.)  Wear hair covers, masks, gowns, and gloves during surgery.  Give antibiotic medicine, if prescribed. Not all surgeries need this medicine.  What to bring on the day of surgery  Photo ID and insurance card  Copy of your health care directive, if you have one  Glasses and hearing aids (bring cases)  You can't wear contacts during surgery  Inhaler  and eye drops, if you use them (tell us about these when you arrive)  CPAP machine or breathing device, if you use them  A few personal items, if spending the night  If you have . . .  A pacemaker, ICD (cardiac defibrillator), or other implant: Bring the ID card.  An implanted stimulator: Bring the remote control.  A legal guardian: Bring a copy of the certified (court-stamped) guardianship papers.  Please remove any jewelry, including body piercings. Leave jewelry and other valuables at home.  If you're going home the day of surgery  You must have a responsible adult drive you home. They should stay with you overnight as well.  If you don't have someone to stay with you, and you aren't safe to go home alone, we may keep you overnight. Insurance often won't pay for this.  After surgery  If it's hard to control your pain or you need more pain medicine, please call your surgeon's office.  Questions?   If you have any questions for your care team, list them here:   ____________________________________________________________________________________________________________________________________________________________________________________________________________________________________________________________  For informational purposes only. Not to replace the advice of your health care provider. Copyright   2003, 2019 [employer]. All rights reserved. Clinically reviewed by Socrates Kidd MD. [facility] [id number] - REV 08/24.

## 2024-10-17 NOTE — PROGRESS NOTES
Preoperative Evaluation  [address] [address]  Phone: [phone]  Primary Provider: Conor Corea MD  Pre-op Performing Provider: Megan Healy PA-C  Oct 17, 2024         10/17/2024   Surgical Information   What procedure is being done? Aquablation of Prostate   Facility or Hospital where procedure/surgery will be performed: [facility]   Who is doing the procedure / surgery? Dr Carrizales   Date of surgery / procedure: 10/28/24   Time of surgery / procedure: 7:45   Where do you plan to recover after surgery? at home with family        Fax number for surgical facility: [phone]    Assessment & Plan     The proposed surgical procedure is considered LOW risk.    Preop general physical exam  - CBC with platelets  - Basic metabolic panel  (Ca, Cl, CO2, Creat, Gluc, K, Na, BUN)    Lower urinary tract symptoms due to benign prostatic hyperplasia  Reason for surgery        - No identified additional risk factors other than previously addressed    Antiplatelet or Anticoagulation Medication Instructions   - Patient is on no antiplatelet or anticoagulation medications.    Additional Medication Instructions  Take all scheduled medications on the day of surgery    Recommendation  Approval given to proceed with proposed procedure, without further diagnostic evaluation.      Megan Forbes PA-C on 10/17/2024 at 1:52 PM      Deisy Powell is a 49 year old, presenting for the following:  Pre-Op Exam (Patient is here for a pre-op exam.  DOS 10/28/2024. )          10/17/2024     1:44 PM   Additional Questions   Roomed by Harley PEDROZA LPN   Accompanied by Self     HPI related to upcoming procedure:     Andre Rose is a 49 year old male who presents for preop exam undergoing aquablation of prostate. He is feeling well overall in his usual state of health without recent illness.         10/17/2024   Pre-Op Questionnaire   Have you ever had a  heart attack or stroke? No   Have you ever had surgery on your heart or blood vessels, such as a stent placement, a coronary artery bypass, or surgery on an artery in your head, neck, heart, or legs? No   Do you have chest pain with activity? No   Do you have a history of heart failure? No   Do you currently have a cold, bronchitis or symptoms of other infection? No   Do you have a cough, shortness of breath, or wheezing? No   Do you or anyone in your family have previous history of blood clots? No   Do you or does anyone in your family have a serious bleeding problem such as prolonged bleeding following surgeries or cuts? No   Have you ever had problems with anemia or been told to take iron pills? No   Have you had any abnormal blood loss such as black, tarry or bloody stools? No   Have you ever had a blood transfusion? No   Are you willing to have a blood transfusion if it is medically needed before, during, or after your surgery? Yes   Have you or any of your relatives ever had problems with anesthesia? No   Do you have sleep apnea, excessive snoring or daytime drowsiness? No   Do you have any artifical heart valves or other implanted medical devices like a pacemaker, defibrillator, or continuous glucose monitor? No   Do you have artificial joints? No   Are you allergic to latex? No        Health Care Directive  Patient does not have a Health Care Directive or Living Will:     Preoperative Review of    reviewed - no record of controlled substances prescribed.    Status of Chronic Conditions:  See problem list for active medical problems.  Problems all longstanding and stable, except as noted/documented.  See ROS for pertinent symptoms related to these conditions.    Patient Active Problem List    Diagnosis Date Noted    Rectal fistula 11/12/2011     Priority: High    Anal fissure 10/25/2013     Priority: Medium    Hematospermia 08/22/2012     Priority: Medium    Overweight (BMI 25.0-29.9) 12/06/2011      Priority: Medium    CARDIOVASCULAR SCREENING; LDL GOAL LESS THAN 160 10/31/2010     Priority: Medium    Premature ejaculation 10/10/2007     Priority: Medium    Internal hemorrhoids 12/20/2006     Priority: Low     Problem list name updated by automated process. Provider to review        Past Medical History:   Diagnosis Date    Anal fistula     Blood in semen     Epididymitis, bilateral     Former smoker quit 3/10    16 pack year history    Headaches     Hematospermia     Orchitis, epididymitis, and epididymo-orchitis, with abscess     Overweight (BMI 25.0-29.9)     Penile discharge     Premature ejaculation 10/10/2007    Snores      Past Surgical History:   Procedure Laterality Date    ADENOIDECTOMY      EXAM UNDER ANESTHESIA RECTUM  08/25/2012    Procedure: EXAM UNDER ANESTHESIA RECTUM;  EXAM UNDER ANESTHESIA, NO EVIDENCE OF FISTULA FOUND;  Surgeon: Goldberg, Stanley Morton, MD;  Location: [facility]    EYE SURGERY  2016    Not surgery but repeat hole in  macula that sealed over time    FISTULOTOMY RECTUM  12/10/2011    Procedure:FISTULOTOMY RECTUM; Surgeon:GOLDBERG, STANLEY MORTON; Location:[facility]    FISTULOTOMY RECTUM  02/09/2013    Procedure: FISTULOTOMY RECTUM;  FISTULOTOMY;  Surgeon: Goldberg, Stanley Morton, MD;  Location: [facility]    REMV PILONIDAL LESION SIMPLE      ZZHC COLONOSCOPY THRU STOMA, DIAGNOSTIC  2009    repeat age 50     Current Outpatient Medications   Medication Sig Dispense Refill    finasteride (PROSCAR) 5 MG tablet Take 1 tablet by mouth daily      tamsulosin (FLOMAX) 0.4 MG capsule Take 1 capsule (0.4 mg) by mouth daily 90 capsule 3       Allergies   Allergen Reactions    Penicillins Swelling    Seasonal Allergies Other (See Comments)     Runny eyes and nose.          Social History     Tobacco Use    Smoking status: Every Day     Current packs/day: 0.50     Average packs/day: 0.5 packs/day for 20.0 years (10.0 ttl pk-yrs)     Types: Cigarettes    Smokeless tobacco: Never    Tobacco comments:      "off and on for many years...   Substance Use Topics    Alcohol use: Yes     Family History   Problem Relation Age of Onset    Hypertension Mother     Lung Cancer Mother     Hyperlipidemia Mother     Prostate Cancer Maternal Uncle         ~52    Prostate Cancer Paternal Grandfather     Alzheimer Disease Maternal Grandfather     Cancer - colorectal No family hx of     Gastrointestinal Disease No family hx of     Diabetes No family hx of     C.A.D. No family hx of     Asthma No family hx of     Cerebrovascular Disease No family hx of      History   Drug Use No             Review of Systems  Constitutional, HEENT, cardiovascular, pulmonary, GI, , musculoskeletal, neuro, skin, endocrine and psych systems are negative, except as otherwise noted.    Objective    /82 (BP Location: Right arm, Patient Position: Sitting, Cuff Size: Adult Regular)   Pulse 84   Temp 97.5  F (36.4  C) (Temporal)   Resp 20   Ht 1.753 m (5' 9\")   Wt 79.7 kg (175 lb 9.6 oz)   SpO2 98%   BMI 25.93 kg/m     Estimated body mass index is 25.93 kg/m  as calculated from the following:    Height as of this encounter: 1.753 m (5' 9\").    Weight as of this encounter: 79.7 kg (175 lb 9.6 oz).  Physical Exam  GENERAL: alert and no distress  EYES: Eyes grossly normal to inspection, PERRL and conjunctivae and sclerae normal  HENT: ear canals and TM's normal, nose and mouth without ulcers or lesions  NECK: no adenopathy, no asymmetry, masses, or scars  RESP: lungs clear to auscultation - no rales, rhonchi or wheezes  CV: regular rate and rhythm, normal S1 S2, no S3 or S4, no murmur, click or rub, no peripheral edema  ABDOMEN: soft, nontender, no hepatosplenomegaly, no masses and bowel sounds normal  MS: no gross musculoskeletal defects noted, no edema  SKIN: no suspicious lesions or rashes  NEURO: Normal strength and tone, mentation intact and speech normal  PSYCH: mentation appears normal, affect normal/bright    Recent Labs   Lab Test " 06/28/24  0914   HGB 15.2         POTASSIUM 4.6   CR 1.08        Diagnostics  Recent Results (from the past 24 hour(s))   CBC with platelets    Collection Time: 10/17/24  2:10 PM   Result Value Ref Range    WBC Count 10.2 4.0 - 11.0 10e3/uL    RBC Count 4.86 4.40 - 5.90 10e6/uL    Hemoglobin 15.4 13.3 - 17.7 g/dL    Hematocrit 44.1 40.0 - 53.0 %    MCV 91 78 - 100 fL    MCH 31.7 26.5 - 33.0 pg    MCHC 34.9 31.5 - 36.5 g/dL    RDW 12.7 10.0 - 15.0 %    Platelet Count 206 150 - 450 10e3/uL   Basic metabolic panel  (Ca, Cl, CO2, Creat, Gluc, K, Na, BUN)    Collection Time: 10/17/24  2:10 PM   Result Value Ref Range    Sodium 141 135 - 145 mmol/L    Potassium 4.1 3.4 - 5.3 mmol/L    Chloride 103 98 - 107 mmol/L    Carbon Dioxide (CO2) 27 22 - 29 mmol/L    Anion Gap 11 7 - 15 mmol/L    Urea Nitrogen 15.3 6.0 - 20.0 mg/dL    Creatinine 1.10 0.67 - 1.17 mg/dL    GFR Estimate 82 >60 mL/min/1.73m2    Calcium 10.0 8.8 - 10.4 mg/dL    Glucose 89 70 - 99 mg/dL      No EKG required, no history of coronary heart disease, significant arrhythmia, peripheral arterial disease or other structural heart disease.    Revised Cardiac Risk Index (RCRI)  The patient has the following serious cardiovascular risks for perioperative complications:   - No serious cardiac risks = 0 points     RCRI Interpretation: 0 points: Class I (very low risk - 0.4% complication rate)         Signed Electronically by: Megan Forbes PA-C on 10/17/2024 at 2:01 PM    A copy of this evaluation report is provided to the requesting physician.

## 2024-10-18 LAB
ANION GAP SERPL CALCULATED.3IONS-SCNC: 11 MMOL/L (ref 7–15)
BUN SERPL-MCNC: 15.3 MG/DL (ref 6–20)
CALCIUM SERPL-MCNC: 10 MG/DL (ref 8.8–10.4)
CHLORIDE SERPL-SCNC: 103 MMOL/L (ref 98–107)
CREAT SERPL-MCNC: 1.1 MG/DL (ref 0.67–1.17)
EGFRCR SERPLBLD CKD-EPI 2021: 82 ML/MIN/1.73M2
GLUCOSE SERPL-MCNC: 89 MG/DL (ref 70–99)
HCO3 SERPL-SCNC: 27 MMOL/L (ref 22–29)
POTASSIUM SERPL-SCNC: 4.1 MMOL/L (ref 3.4–5.3)
SODIUM SERPL-SCNC: 141 MMOL/L (ref 135–145)

## 2024-10-18 NOTE — RESULT ENCOUNTER NOTE
Andre,    I have reviewed your lab results below - everything looks good:    - electrolytes, blood sugar and kidney function normal    - blood counts are normal - normal hemoglobin/red blood cells (no anemia), normal white blood cells (infection fighting cells), normal platelets (affect ability to clot normally)      I will have your preop documentation faxed over today. Please let me know if you have any further questions.    Take care,  Megan Forbes PA-C on 10/18/2024 at 7:53 AM

## 2025-01-16 ENCOUNTER — OFFICE VISIT (OUTPATIENT)
Dept: INTERNAL MEDICINE | Facility: CLINIC | Age: 50
End: 2025-01-16
Payer: COMMERCIAL

## 2025-01-16 VITALS
HEIGHT: 69 IN | DIASTOLIC BLOOD PRESSURE: 84 MMHG | WEIGHT: 175.2 LBS | OXYGEN SATURATION: 100 % | BODY MASS INDEX: 25.95 KG/M2 | HEART RATE: 98 BPM | TEMPERATURE: 97.5 F | SYSTOLIC BLOOD PRESSURE: 125 MMHG

## 2025-01-16 DIAGNOSIS — N13.8 BENIGN PROSTATIC HYPERPLASIA WITH URINARY OBSTRUCTION: ICD-10-CM

## 2025-01-16 DIAGNOSIS — Z01.818 PREOP GENERAL PHYSICAL EXAM: Primary | ICD-10-CM

## 2025-01-16 DIAGNOSIS — N40.1 BENIGN PROSTATIC HYPERPLASIA WITH URINARY OBSTRUCTION: ICD-10-CM

## 2025-01-16 NOTE — PROGRESS NOTES
Preoperative Evaluation  [address] [address]  Phone: [phone]  Primary Provider: Conor Corea MD  Pre-op Performing Provider: Tonja Alvarado MD  Jan 16, 2025 1/16/2025   Surgical Information   What procedure is being done? Aquablation   Facility or Hospital where procedure/surgery will be performed: [facility]   Who is doing the procedure / surgery? [facility] perry   Date of surgery / procedure: 1/27/25   Time of surgery / procedure: 2:15   Where do you plan to recover after surgery? at home with family     Fax number for surgical facility: [phone]    Assessment & Plan     The proposed surgical procedure is considered LOW risk.    Preop general physical exam  Medically optimized for planned procedure. Past labs reviewed. Urology notes reviewed    Benign prostatic hyperplasia with urinary obstruction  Need for procedure            - No identified additional risk factors other than previously addressed    Preoperative Medication Instructions  Antiplatelet or Anticoagulation Medication Instructions   - Patient is on no antiplatelet or anticoagulation medications.    Additional Medication Instructions  Take all scheduled medications on the day of surgery    Recommendation  Approval given to proceed with proposed procedure, without further diagnostic evaluation.    Deisy Powell is a 49 year old, presenting for the following:  Pre-Op Exam        HPI related to upcoming procedure: no recent illness, previous procedure delayed due to IVF shortage. Has BPH with symptoms of obstruction        1/16/2025   Pre-Op Questionnaire   Have you ever had a heart attack or stroke? No   Have you ever had surgery on your heart or blood vessels, such as a stent placement, a coronary artery bypass, or surgery on an artery in your head, neck, heart, or legs? No   Do you have chest pain with activity? No   Do you have a history of heart  failure? No   Do you currently have a cold, bronchitis or symptoms of other infection? No   Do you have a cough, shortness of breath, or wheezing? No   Do you or anyone in your family have previous history of blood clots? No   Do you or does anyone in your family have a serious bleeding problem such as prolonged bleeding following surgeries or cuts? No   Have you ever had problems with anemia or been told to take iron pills? No   Have you had any abnormal blood loss such as black, tarry or bloody stools? No   Have you ever had a blood transfusion? No   Are you willing to have a blood transfusion if it is medically needed before, during, or after your surgery? Yes   Have you or any of your relatives ever had problems with anesthesia? No   Do you have sleep apnea, excessive snoring or daytime drowsiness? No   Do you have any artifical heart valves or other implanted medical devices like a pacemaker, defibrillator, or continuous glucose monitor? No   Do you have artificial joints? No   Are you allergic to latex? No             Patient Active Problem List    Diagnosis Date Noted    Rectal fistula 11/12/2011     Priority: High    Anal fissure 10/25/2013     Priority: Medium    Hematospermia 08/22/2012     Priority: Medium    Overweight (BMI 25.0-29.9) 12/06/2011     Priority: Medium    CARDIOVASCULAR SCREENING; LDL GOAL LESS THAN 160 10/31/2010     Priority: Medium    Premature ejaculation 10/10/2007     Priority: Medium    Internal hemorrhoids 12/20/2006     Priority: Low     Problem list name updated by automated process. Provider to review        Past Medical History:   Diagnosis Date    Anal fistula     Blood in semen     Epididymitis, bilateral     Former smoker quit 3/10    16 pack year history    Headaches     Hematospermia     Orchitis, epididymitis, and epididymo-orchitis, with abscess     Overweight (BMI 25.0-29.9)     Penile discharge     Premature ejaculation 10/10/2007    Snores      Past Surgical History:  "  Procedure Laterality Date    ADENOIDECTOMY      EXAM UNDER ANESTHESIA RECTUM  08/25/2012    Procedure: EXAM UNDER ANESTHESIA RECTUM;  EXAM UNDER ANESTHESIA, NO EVIDENCE OF FISTULA FOUND;  Surgeon: Goldberg, Stanley Morton, MD;  Location: [facility]    EYE SURGERY  2016    Not surgery but repeat hole in  macula that sealed over time    FISTULOTOMY RECTUM  12/10/2011    Procedure:FISTULOTOMY RECTUM; Surgeon:GOLDBERG, STANLEY MORTON; Location:[facility]    FISTULOTOMY RECTUM  02/09/2013    Procedure: FISTULOTOMY RECTUM;  FISTULOTOMY;  Surgeon: Goldberg, Stanley Morton, MD;  Location: [facility]    REMV PILONIDAL LESION SIMPLE      ZZHC COLONOSCOPY THRU STOMA, DIAGNOSTIC  2009    repeat age 50     Current Outpatient Medications   Medication Sig Dispense Refill    finasteride (PROSCAR) 5 MG tablet Take 1 tablet by mouth daily      tamsulosin (FLOMAX) 0.4 MG capsule Take 1 capsule (0.4 mg) by mouth daily 90 capsule 3       Allergies   Allergen Reactions    Penicillins Swelling    Seasonal Allergies Other (See Comments)     Runny eyes and nose.          Social History     Tobacco Use    Smoking status: Every Day     Current packs/day: 0.50     Average packs/day: 0.5 packs/day for 20.0 years (10.0 ttl pk-yrs)     Types: Cigarettes    Smokeless tobacco: Never    Tobacco comments:     off and on for many years...   Substance Use Topics    Alcohol use: Yes       History   Drug Use No               Objective    /84   Pulse 98   Temp 97.5  F (36.4  C) (Temporal)   Ht 1.753 m (5' 9\")   Wt 79.5 kg (175 lb 3.2 oz)   SpO2 100%   BMI 25.87 kg/m     Estimated body mass index is 25.87 kg/m  as calculated from the following:    Height as of this encounter: 1.753 m (5' 9\").    Weight as of this encounter: 79.5 kg (175 lb 3.2 oz).  Physical Exam  GENERAL: alert and no distress  RESP: lungs clear to auscultation - no rales, rhonchi or wheezes  CV: regular rate and rhythm, normal S1 S2, no S3 or S4, no murmur, click or rub, no peripheral " edema  MS: no gross musculoskeletal defects noted, no edema    Recent Labs   Lab Test 10/17/24  1410 06/28/24  0914   HGB 15.4 15.2    199    140   POTASSIUM 4.1 4.6   CR 1.10 1.08        Diagnostics  No labs were ordered during this visit.   No EKG required for low risk surgery (cataract, skin procedure, breast biopsy, etc).    Revised Cardiac Risk Index (RCRI)  The patient has the following serious cardiovascular risks for perioperative complications:   - No serious cardiac risks = 0 points     RCRI Interpretation: 0 points: Class I (very low risk - 0.4% complication rate)         Signed Electronically by: Tonja Alvarado MD  A copy of this evaluation report is provided to the requesting physician.

## 2025-01-16 NOTE — PATIENT INSTRUCTIONS
How to Take Your Medication Before Surgery  Preoperative Medication Instructions   Antiplatelet or Anticoagulation Medication Instructions   - Patient is on no antiplatelet or anticoagulation medications.    Additional Medication Instructions  Take all scheduled medications on the day of surgery       Patient Education   Preparing for Your Surgery  For Adults  Getting started  In most cases, a nurse will call to review your health history and instructions. They will give you an arrival time based on your scheduled surgery time. Please be ready to share:  Your doctor's clinic name and phone number  Your medical, surgical, and anesthesia history  A list of allergies and sensitivities  A list of medicines, including herbal treatments and over-the-counter drugs  Whether the patient has a legal guardian (ask how to send us the papers in advance)  Note: You may not receive a call if you were seen at our PAC (Preoperative Assessment Center).  Please tell us if you're pregnant--or if there's any chance you might be pregnant. Some surgeries may injure a fetus (unborn baby), so they require a pregnancy test. Surgeries that are safe for a fetus don't always need a test, and you can choose whether to have one.   Preparing for surgery  Within 10 to 30 days of surgery: Have a pre-op exam (sometimes called an H&P, or History and Physical). This can be done at a clinic or pre-operative center.  If you're having a , you may not need this exam. Talk to your care team.  At your pre-op exam, talk to your care team about all medicines you take. (This includes CBD oil and any drugs, such as THC, marijuana, and other forms of cannabis.) If you need to stop any medicine before surgery, ask when to start taking it again.  This is for your safety. Many medicines and drugs can make you bleed too much during surgery. Some change how well surgery (anesthesia) drugs work.  Call your insurance company to let them know you're having  surgery. (If you don't have insurance, call [phone].)  Call your clinic if there's any change in your health. This includes a scrape or scratch near the surgery site, or any signs of a cold (sore throat, runny nose, cough, rash, fever).  Eating and drinking guidelines  For your safety: Unless your surgeon tells you otherwise, follow the guidelines below.  Eat and drink as normal until 8 hours before you arrive for surgery. After that, no food or milk. You can spit out gum when you arrive.  Drink clear liquids until 2 hours before you arrive. These are liquids you can see through, like water, Gatorade, and Propel Water. They also include plain black coffee and tea (no cream or milk).  No alcohol for 24 hours before you arrive. The night before surgery, stop any drinks that contain THC.  If your care team tells you to take medicine on the morning of surgery, it's okay to take it with a sip of water. No other medicines or drugs are allowed (including CBD oil)--follow your care team's instructions.  If you have questions the day of surgery, call your hospital or surgery center.   Preventing infection  Shower or bathe the night before and the morning of surgery. Follow the instructions your clinic gave you. (If no instructions, use regular soap.)  Don't shave or clip hair near your surgery site. We'll remove the hair if needed.  Don't smoke or vape the morning of surgery. No chewing tobacco for 6 hours before you arrive. A nicotine patch is okay. You may spit out nicotine gum when you arrive.  For some surgeries, the surgeon will tell you to fully quit smoking and nicotine.  We will make every effort to keep you safe from infection. We will:  Clean our hands often with soap and water (or an alcohol-based hand rub).  Clean the skin at your surgery site with a special soap that kills germs.  Give you a special gown to keep you warm. (Cold raises the risk of infection.)  Wear hair covers, masks, gowns, and gloves  during surgery.  Give antibiotic medicine, if prescribed. Not all surgeries need this medicine.  What to bring on the day of surgery  Photo ID and insurance card  Copy of your health care directive, if you have one  Glasses and hearing aids (bring cases)  You can't wear contacts during surgery  Inhaler and eye drops, if you use them (tell us about these when you arrive)  CPAP machine or breathing device, if you use them  A few personal items, if spending the night  If you have . . .  A pacemaker, ICD (cardiac defibrillator), or other implant: Bring the ID card.  An implanted stimulator: Bring the remote control.  A legal guardian: Bring a copy of the certified (court-stamped) guardianship papers.  Please remove any jewelry, including body piercings. Leave jewelry and other valuables at home.  If you're going home the day of surgery  You must have a responsible adult drive you home. They should stay with you overnight as well.  If you don't have someone to stay with you, and you aren't safe to go home alone, we may keep you overnight. Insurance often won't pay for this.  After surgery  If it's hard to control your pain or you need more pain medicine, please call your surgeon's office.  Questions?   If you have any questions for your care team, list them here:   ____________________________________________________________________________________________________________________________________________________________________________________________________________________________________________________________  For informational purposes only. Not to replace the advice of your health care provider. Copyright   2003, 2019 [facility]. All rights reserved. Clinically reviewed by Socrates Kidd MD. [employer] [id number] - REV 08/24.

## 2025-02-28 ENCOUNTER — TRANSFERRED RECORDS (OUTPATIENT)
Dept: HEALTH INFORMATION MANAGEMENT | Facility: CLINIC | Age: 50
End: 2025-02-28
Payer: COMMERCIAL

## 2025-03-25 ENCOUNTER — TRANSFERRED RECORDS (OUTPATIENT)
Dept: HEALTH INFORMATION MANAGEMENT | Facility: CLINIC | Age: 50
End: 2025-03-25
Payer: COMMERCIAL

## 2025-05-20 ENCOUNTER — TRANSFERRED RECORDS (OUTPATIENT)
Dept: HEALTH INFORMATION MANAGEMENT | Facility: CLINIC | Age: 50
End: 2025-05-20
Payer: COMMERCIAL

## 2025-06-21 ENCOUNTER — HEALTH MAINTENANCE LETTER (OUTPATIENT)
Age: 50
End: 2025-06-21